# Patient Record
Sex: FEMALE | Race: WHITE | NOT HISPANIC OR LATINO | Employment: UNEMPLOYED | URBAN - METROPOLITAN AREA
[De-identification: names, ages, dates, MRNs, and addresses within clinical notes are randomized per-mention and may not be internally consistent; named-entity substitution may affect disease eponyms.]

---

## 2017-03-03 ENCOUNTER — LAB VISIT (OUTPATIENT)
Dept: LAB | Facility: HOSPITAL | Age: 27
End: 2017-03-03
Attending: OBSTETRICS & GYNECOLOGY
Payer: MEDICAID

## 2017-03-03 DIAGNOSIS — E03.9 UNSPECIFIED HYPOTHYROIDISM: Primary | ICD-10-CM

## 2017-03-03 DIAGNOSIS — Z00.00 ROUTINE GENERAL MEDICAL EXAMINATION AT A HEALTH CARE FACILITY: ICD-10-CM

## 2017-03-03 LAB
ALBUMIN SERPL BCP-MCNC: 3.5 G/DL
ALP SERPL-CCNC: 60 U/L
ALT SERPL W/O P-5'-P-CCNC: 26 U/L
ANION GAP SERPL CALC-SCNC: 8 MMOL/L
ANISOCYTOSIS BLD QL SMEAR: SLIGHT
AST SERPL-CCNC: 19 U/L
BASOPHILS # BLD AUTO: 0.01 K/UL
BASOPHILS NFR BLD: 0.1 %
BILIRUB SERPL-MCNC: 0.2 MG/DL
BUN SERPL-MCNC: 10 MG/DL
CALCIUM SERPL-MCNC: 8.9 MG/DL
CHLORIDE SERPL-SCNC: 110 MMOL/L
CHOLEST/HDLC SERPL: 4.2 {RATIO}
CO2 SERPL-SCNC: 23 MMOL/L
CREAT SERPL-MCNC: 0.7 MG/DL
DACRYOCYTES BLD QL SMEAR: ABNORMAL
DIFFERENTIAL METHOD: ABNORMAL
EOSINOPHIL # BLD AUTO: 0.1 K/UL
EOSINOPHIL NFR BLD: 1.1 %
ERYTHROCYTE [DISTWIDTH] IN BLOOD BY AUTOMATED COUNT: 13.4 %
EST. GFR  (AFRICAN AMERICAN): >60 ML/MIN/1.73 M^2
EST. GFR  (NON AFRICAN AMERICAN): >60 ML/MIN/1.73 M^2
GLUCOSE SERPL-MCNC: 92 MG/DL
HCT VFR BLD AUTO: 36.2 %
HDL/CHOLESTEROL RATIO: 23.7 %
HDLC SERPL-MCNC: 139 MG/DL
HDLC SERPL-MCNC: 33 MG/DL
HGB BLD-MCNC: 11.7 G/DL
LDLC SERPL CALC-MCNC: 85 MG/DL
LYMPHOCYTES # BLD AUTO: 2.2 K/UL
LYMPHOCYTES NFR BLD: 26.5 %
MCH RBC QN AUTO: 27.3 PG
MCHC RBC AUTO-ENTMCNC: 32.3 %
MCV RBC AUTO: 85 FL
MONOCYTES # BLD AUTO: 0.5 K/UL
MONOCYTES NFR BLD: 6.1 %
NEUTROPHILS # BLD AUTO: 5.4 K/UL
NEUTROPHILS NFR BLD: 66.4 %
NONHDLC SERPL-MCNC: 106 MG/DL
OVALOCYTES BLD QL SMEAR: ABNORMAL
PLATELET # BLD AUTO: 236 K/UL
PMV BLD AUTO: 10 FL
POIKILOCYTOSIS BLD QL SMEAR: SLIGHT
POTASSIUM SERPL-SCNC: 3.9 MMOL/L
PROT SERPL-MCNC: 7.3 G/DL
RBC # BLD AUTO: 4.28 M/UL
SODIUM SERPL-SCNC: 141 MMOL/L
SPHEROCYTES BLD QL SMEAR: ABNORMAL
T3 SERPL-MCNC: 115 NG/DL
T4 FREE SERPL-MCNC: 0.98 NG/DL
T4 SERPL-MCNC: 7.1 UG/DL
TRIGL SERPL-MCNC: 105 MG/DL
TSH SERPL DL<=0.005 MIU/L-ACNC: 2.53 UIU/ML
WBC # BLD AUTO: 8.2 K/UL

## 2017-03-03 PROCEDURE — 85025 COMPLETE CBC W/AUTO DIFF WBC: CPT

## 2017-03-03 PROCEDURE — 84480 ASSAY TRIIODOTHYRONINE (T3): CPT

## 2017-03-03 PROCEDURE — 84439 ASSAY OF FREE THYROXINE: CPT

## 2017-03-03 PROCEDURE — 36415 COLL VENOUS BLD VENIPUNCTURE: CPT

## 2017-03-03 PROCEDURE — 84436 ASSAY OF TOTAL THYROXINE: CPT

## 2017-03-03 PROCEDURE — 80053 COMPREHEN METABOLIC PANEL: CPT

## 2017-03-03 PROCEDURE — 80061 LIPID PANEL: CPT

## 2017-03-03 PROCEDURE — 84443 ASSAY THYROID STIM HORMONE: CPT

## 2017-03-08 PROBLEM — Z98.890 S/P SINUS SURGERY: Status: ACTIVE | Noted: 2017-03-08

## 2017-04-14 ENCOUNTER — HOSPITAL ENCOUNTER (EMERGENCY)
Facility: HOSPITAL | Age: 27
Discharge: HOME | End: 2017-04-14
Attending: EMERGENCY MEDICINE
Payer: MEDICAID

## 2017-04-14 VITALS
DIASTOLIC BLOOD PRESSURE: 78 MMHG | SYSTOLIC BLOOD PRESSURE: 130 MMHG | BODY MASS INDEX: 33.34 KG/M2 | HEIGHT: 68 IN | RESPIRATION RATE: 16 BRPM | WEIGHT: 220 LBS | TEMPERATURE: 98 F | OXYGEN SATURATION: 100 % | HEART RATE: 86 BPM

## 2017-04-14 DIAGNOSIS — R10.2 PELVIC PAIN IN FEMALE: ICD-10-CM

## 2017-04-14 DIAGNOSIS — M54.50 ACUTE BILATERAL LOW BACK PAIN WITHOUT SCIATICA: Primary | ICD-10-CM

## 2017-04-14 LAB
B-HCG UR QL: NEGATIVE
BACTERIA #/AREA URNS HPF: NORMAL /HPF
BILIRUB UR QL STRIP: NEGATIVE
CLARITY UR: CLEAR
COLOR UR: ABNORMAL
CTP QC/QA: YES
GLUCOSE UR QL STRIP: NEGATIVE
HGB UR QL STRIP: ABNORMAL
KETONES UR QL STRIP: NEGATIVE
LEUKOCYTE ESTERASE UR QL STRIP: ABNORMAL
MICROSCOPIC COMMENT: NORMAL
NITRITE UR QL STRIP: NEGATIVE
PH UR STRIP: 8 [PH] (ref 5–8)
PROT UR QL STRIP: NEGATIVE
RBC #/AREA URNS HPF: 2 /HPF (ref 0–4)
SP GR UR STRIP: 1 (ref 1–1.03)
SQUAMOUS #/AREA URNS HPF: 3 /HPF
URN SPEC COLLECT METH UR: ABNORMAL
UROBILINOGEN UR STRIP-ACNC: NEGATIVE EU/DL
WBC #/AREA URNS HPF: 2 /HPF (ref 0–5)

## 2017-04-14 PROCEDURE — 99283 EMERGENCY DEPT VISIT LOW MDM: CPT | Mod: 25

## 2017-04-14 PROCEDURE — 81000 URINALYSIS NONAUTO W/SCOPE: CPT

## 2017-04-14 PROCEDURE — 81025 URINE PREGNANCY TEST: CPT | Performed by: EMERGENCY MEDICINE

## 2017-04-14 NOTE — ED AVS SNAPSHOT
[facility]  [address] [place] [address]               Martin Huddleston   2017 12:33 PM   ED    Description:  Female : 1990   Department:  [facility]           Your Care was Coordinated By:     Provider Role From To    Guillermo Mosher III, MD Attending Provider [phone] --      Reason for Visit     burning urination           Diagnoses this Visit        Comments    Acute bilateral low back pain without sciatica    -  Primary     Pelvic pain in female           ED Disposition     ED Disposition Condition Comment    Discharge             To Do List           Follow-up Information     Follow up with Messi Hines MD In 1 week.    Specialties:  Obstetrics, Obstetrics and Gynecology    Contact information:    [address]  [place] [address]  [phone]        [facility] On Call     [facility] Care [facility] -  Assistance  Unless otherwise directed by your provider, please contact [facility], our nurse care line that is available for  assistance.     Registered nurses in the [facility] provide: appointment scheduling, clinical advisement, health education, and other advisory services.  Call: [phone] (toll free)               Medications           Message regarding Medications     Verify the changes and/or additions to your medication regime listed below are the same as discussed with your clinician today.  If any of these changes or additions are incorrect, please notify your healthcare provider.        STOP taking these medications     escitalopram oxalate (LEXAPRO) 10 MG tablet Take 1 tablet (10 mg total) by mouth once daily.    hydrocodone-acetaminophen 7.5-325mg (NORCO) 7.5-325 mg per tablet Take 7.5-325 tablets by mouth every 6 (six) hours as needed.           Verify that the below list of medications is an accurate representation of the medications you are currently taking.  If none  "reported, the list may be blank. If incorrect, please contact your healthcare provider. Carry this list with you in case of emergency.           Current Medications            Clinical Reference Information           Your Vitals Were     BP Pulse Temp Resp Height Weight    130/78 (BP Location: Right arm, Patient Position: Sitting, BP Method: Automatic) 86 98.4 °F (36.9 °C) (Oral) 16 5' 8" (1.727 m) 99.8 kg (220 lb)    Last Period SpO2 BMI          04/03/2017 (Exact Date) 100% 33.45 kg/m2        Allergies as of 4/14/2017     No Known Allergies      Immunizations Administered on Date of Encounter - 4/14/2017     None      ED Micro, Lab, POCT     Start Ordered       Status Ordering Provider    04/14/17 1314 04/14/17 1313  Urinalysis  STAT      Final result     04/14/17 1313 04/14/17 1313  Urinalysis Microscopic  Once      Final result     04/14/17 1140 04/14/17 1139  POCT urine pregnancy  Once      Final result       ED Imaging Orders     None        Discharge Instructions       Return to the emergency department if you develop fever higher than 100.4°, worsening abdominal pain, blood in your urine, vomiting, or for any new or worsening medical concerns.  As discussed, you need to see your OB/GYN for further evaluation of this pain.  You should also see your primary physician about this.  You should take MiraLAX daily until you are having at least 1 soft bowel movement every day.      [facility] Sign-Up     Activating your [facility] account is as easy as 1-2-3!     1) Visit [email or web address], select Sign Up Now, enter this activation code and your date of birth, then select Next.  [email or web address]  Expires: 5/29/2017  2:44 PM      2) Create a username and password to use when you visit [facility] in the future and select a security question in case you lose your password and select Next.    3) Enter your e-mail address and click Sign Up!    Additional Information  If you have questions, please e-mail [email or web address] or call [phone] to talk to our [facility] staff. Remember, [facility] is NOT to be used for urgent needs. For medical emergencies, dial 911.          [facility] complies with applicable Federal civil rights laws and does not discriminate on the basis of race, color, national origin, age, disability, or sex.        Language Assistance Services     ATTENTION: Language assistance services are available, free of charge. Please call [phone].      ATENCIÓN: Si habla gracyañol, tiene a stephen disposición servicios gratuitos de asistencia lingüística. Llame al [phone].     TRINIDAD Ý: N?u b?n nói Ti?ng Vi?t, có các d?ch v? h? tr? ngôn ng? mi?n phí dành cho b?n. G?i s? [phone].

## 2017-04-14 NOTE — DISCHARGE INSTRUCTIONS
Return to the emergency department if you develop fever higher than 100.4°, worsening abdominal pain, blood in your urine, vomiting, or for any new or worsening medical concerns.  As discussed, you need to see your OB/GYN for further evaluation of this pain.  You should also see your primary physician about this.  You should take MiraLAX daily until you are having at least 1 soft bowel movement every day.

## 2017-04-14 NOTE — ED PROVIDER NOTES
Encounter Date: 4/14/2017       History     Chief Complaint   Patient presents with    burning urination     pt c/o burning urination and lower back pain X 2 days.     Review of patient's allergies indicates:  No Known Allergies  HPI Comments: 26-year-old female presents complaining of bilateral low back/flank pain for 2 days with associated dysuria, nausea, and chills.  She describes the pain is constant, moderate to severe, nonradiating, aching.  She does report a previous episode of similar symptoms but does not know what the diagnosis was at that time.  She has concerns about kidney stone because her brother has had a stone before.  She denies treatment at home.  No diarrhea, fever, constipation, respiratory symptoms, food intolerance.    The history is provided by the patient.     Past Medical History:   Diagnosis Date    Constipation      Past Surgical History:   Procedure Laterality Date    intestinal surg       as a child    SINUS SURGERY  02/02/2017     Family History   Problem Relation Age of Onset    Colon cancer Father     Diabetes Mother     Hypertension Mother      Social History   Substance Use Topics    Smoking status: Never Smoker    Smokeless tobacco: Never Used    Alcohol use No     Review of Systems   Constitutional: Negative for fever.   HENT: Negative for sore throat.    Respiratory: Negative for shortness of breath.    Cardiovascular: Negative for chest pain.   Gastrointestinal: Negative for nausea.   Genitourinary: Negative for dysuria.   Musculoskeletal: Negative for back pain.   Skin: Negative for rash.   Neurological: Negative for weakness.   Hematological: Does not bruise/bleed easily.       Physical Exam   Initial Vitals   BP Pulse Resp Temp SpO2   04/14/17 1135 04/14/17 1135 04/14/17 1135 04/14/17 1135 04/14/17 1135   152/86 90 16 97.9 °F (36.6 °C) 97 %     Physical Exam    Constitutional: She appears well-developed and well-nourished. She is not diaphoretic. No distress.    HENT:   Head: Normocephalic and atraumatic.   Right Ear: External ear normal.   Left Ear: External ear normal.   Eyes: Conjunctivae and EOM are normal. Pupils are equal, round, and reactive to light.   Neck: Normal range of motion.   Cardiovascular: Normal rate and regular rhythm.   Pulmonary/Chest: No respiratory distress.   Abdominal: She exhibits no distension. There is tenderness (bilateral CVA tenderness, mild pelvic tenderness).   Musculoskeletal: She exhibits no edema.   Neurological: She is alert. GCS eye subscore is 4. GCS verbal subscore is 5. GCS motor subscore is 6.   Skin: Skin is warm and dry.   Psychiatric: She has a normal mood and affect. Thought content normal.         ED Course   Procedures  Labs Reviewed   POCT URINE PREGNANCY             Medical Decision Making:   Initial Assessment:   26-year-old female presents complaining of bilateral low back/flank tenderness for the last couple days with associated burning at the end of urination, nausea, and chills.  She denies urinary frequency, urinary urgency, foul-smelling urine.  Examination reveals mild pelvic tenderness, bilateral low back tenderness, no fever, normal heart rate, well-appearing overall.  Differential Diagnosis:   Cystitis, pyelonephritis most likely. Kidney stone, pelvic inflammatory disease, appendicitis, diverticulitis much less likely.  Will obtain urinalysis as initial screening test.    ED Management:  Patient's urinalysis is not consistent with urinary tract infection.  Urine pregnancy test negative.  On further questioning, the patient does report that she suffers with chronic constipation.  She also reports that this current episode of pain is actually more severe than, but similar to past episodes of pain that she has been having for years, which have been more frequent within the last month since she stopped drinking caffeine.  At this time she continues to deny vomiting, vaginal discharge, high risk sexual behavior,  fever.  Patient continues to be well-appearing.  She does now report that her pain is severe.  I have recommended further workup with CT, but the patient declines citing concerns for risks of radiation.  I discussed her differential diagnosis with her at length, including a discussion of gynecologic pathology.  Also discussed the need for follow-up with OB/GYN, primary care.  Return precautions given verbally and in writing.                   ED Course     Clinical Impression:   The primary encounter diagnosis was Acute bilateral low back pain without sciatica. A diagnosis of Pelvic pain in female was also pertinent to this visit.          Guillermo Mosher III, MD  04/14/17 7283

## 2017-05-08 ENCOUNTER — OFFICE VISIT (OUTPATIENT)
Dept: OBSTETRICS AND GYNECOLOGY | Facility: CLINIC | Age: 27
End: 2017-05-08
Payer: MEDICAID

## 2017-05-08 ENCOUNTER — TELEPHONE (OUTPATIENT)
Dept: OBSTETRICS AND GYNECOLOGY | Facility: CLINIC | Age: 27
End: 2017-05-08

## 2017-05-08 VITALS
HEIGHT: 68 IN | DIASTOLIC BLOOD PRESSURE: 70 MMHG | WEIGHT: 253 LBS | SYSTOLIC BLOOD PRESSURE: 126 MMHG | BODY MASS INDEX: 38.34 KG/M2

## 2017-05-08 DIAGNOSIS — Z32.01 POSITIVE URINE PREGNANCY TEST: ICD-10-CM

## 2017-05-08 DIAGNOSIS — N92.6 ABNORMAL MENSES: Primary | ICD-10-CM

## 2017-05-08 DIAGNOSIS — K04.7 TOOTH ABSCESS: ICD-10-CM

## 2017-05-08 LAB
B-HCG UR QL: POSITIVE
CTP QC/QA: YES

## 2017-05-08 PROCEDURE — 99999 PR PBB SHADOW E&M-EST. PATIENT-LVL II: CPT | Mod: PBBFAC,,, | Performed by: OBSTETRICS & GYNECOLOGY

## 2017-05-08 PROCEDURE — 99213 OFFICE O/P EST LOW 20 MIN: CPT | Mod: S$PBB,,, | Performed by: OBSTETRICS & GYNECOLOGY

## 2017-05-08 PROCEDURE — 99212 OFFICE O/P EST SF 10 MIN: CPT | Mod: PBBFAC | Performed by: OBSTETRICS & GYNECOLOGY

## 2017-05-08 PROCEDURE — 81025 URINE PREGNANCY TEST: CPT | Mod: PBBFAC | Performed by: OBSTETRICS & GYNECOLOGY

## 2017-05-08 RX ORDER — AMOXICILLIN 875 MG/1
875 TABLET, FILM COATED ORAL EVERY 12 HOURS
Qty: 20 TABLET | Refills: 0 | Status: SHIPPED | OUTPATIENT
Start: 2017-05-08 | End: 2017-05-18

## 2017-05-08 NOTE — TELEPHONE ENCOUNTER
----- Message from Erin Jarrell sent at 5/8/2017  8:31 AM CDT -----  Contact: self  Patient is scheduled for an appt next week . She took a home pregnancy test & it was positive .  She has questions about antibiotics .    [phone]   LL      05/08/2017 8:51 AM  Spoke with pt and scheduled her for a confirmation of pregnancy.

## 2017-05-08 NOTE — PROGRESS NOTES
Subjective:       Patient ID: Martin Huddleston is a 26 y.o. female.    Chief Complaint:  Possible Pregnancy      History of Present Illness  HPI  Missed Menses/ Possible Pregnancy  Patient complains of amenorrhea. She believes she could be pregnant. Pregnancy is desired. Sexual Activity: single partner, contraception: none. Current symptoms also include: positive home pregnancy test. Last period was normal.     Patient's last menstrual period was 2017 (exact date).     Pt also c/o tooth pain on L upper molars.                GYN & OB History  Patient's last menstrual period was 2017 (exact date).   Date of Last Pap: No result found    OB History    Para Term  AB SAB TAB Ectopic Multiple Living   3 2 2      0 2      # Outcome Date GA Lbr Be/2nd Weight Sex Delivery Anes PTL Lv   3 Current            2 Term 14 40w0d / 00:08 4.072 kg (8 lb 15.6 oz) M Vag-Spont EPI N Y   1 Term 08/10/12 40w0d   M Vag-Spont EPI N Y          Review of Systems  Review of Systems   Constitutional: Negative.    Respiratory: Negative.    Cardiovascular: Negative.    Gastrointestinal: Negative.    Genitourinary: Negative.    Musculoskeletal: Negative.    Hematological: Negative.    Psychiatric/Behavioral: Negative.    Breast: negative.            Objective:    Physical Exam:   Constitutional: She is oriented to person, place, and time. She appears well-developed and well-nourished. No distress.    HENT:   Head: Normocephalic and atraumatic.   Mouth/Throat:         Neck: Normal range of motion. No thyromegaly present.    Cardiovascular: Normal rate.     Pulmonary/Chest: Effort normal. No respiratory distress.        Abdominal: Soft. She exhibits no distension.             Musculoskeletal: Normal range of motion and moves all extremeties.       Neurological: She is alert and oriented to person, place, and time.    Skin: She is not diaphoretic.    Psychiatric: She has a normal mood and affect. Her behavior is  normal. Judgment and thought content normal.          Assessment:        1. Abnormal menses    2. Tooth abscess               Plan:      1.  Pt taking daily MVi  2.  Rx amoxil 875 mg bid x 10 d  3.  Dental letter given and pt told to f/u with dentist  4.  F/u 3 wks for NOB and sono

## 2017-05-08 NOTE — MR AVS SNAPSHOT
"    [facility] - OB/ GYN  [address] [facility] [address]  [place] [address]  Phone: [phone]                  Martin Huddleston   2017 10:20 AM   Office Visit    Description:  Female : 1990   Provider:  Messi Hines MD   Department:  [facility] - OB/ GYN           Reason for Visit     Possible Pregnancy                To Do List           Future Appointments        Provider Department Dept Phone    2017 1:30 PM Messi Hines MD [facility] - OB/ -[phone]      Goals (5 Years of Data)     None      [facility]     [facility] On Call Nurse Care Line -  Assistance  Unless otherwise directed by your provider, please contact [facility], our nurse care line that is available for  assistance.     Registered nurses in the [facility] provide: appointment scheduling, clinical advisement, health education, and other advisory services.  Call: [phone] (toll free)               Medications           Message regarding Medications     Verify the changes and/or additions to your medication regime listed below are the same as discussed with your clinician today.  If any of these changes or additions are incorrect, please notify your healthcare provider.             Verify that the below list of medications is an accurate representation of the medications you are currently taking.  If none reported, the list may be blank. If incorrect, please contact your healthcare provider. Carry this list with you in case of emergency.                Clinical Reference Information           Your Vitals Were     BP Height Weight Last Period BMI    126/70 5' 8" (1.727 m) 114.8 kg (253 lb) 2017 (Exact Date) 38.47 kg/m2      Blood Pressure          Most Recent Value    BP  126/70      Allergies as of 2017     No Known Allergies      Immunizations Administered on Date of Encounter - 2017     None      [facility] Sign-Up     Activating your [facility] account is as easy as 1-2-3! "     1) Visit [email or web address], select Sign Up Now, enter this activation code and your date of birth, then select Next.  [email or web address]  Expires: 5/29/2017  2:44 PM      2) Create a username and password to use when you visit [facility] in the future and select a security question in case you lose your password and select Next.    3) Enter your e-mail address and click Sign Up!    Additional Information  If you have questions, please e-mail [email or web address] or call [phone] to talk to our [facility] staff. Remember, [facility] is NOT to be used for urgent needs. For medical emergencies, dial 911.         Language Assistance Services     ATTENTION: Language assistance services are available, free of charge. Please call [phone].      ATENCIÓN: Si habla español, tiene a stephen disposición servicios gratuitos de asistencia lingüística. Llame al [phone].     CHÚ Ý: N?u b?n nói Ti?ng Vi?t, có các d?ch v? h? tr? ngôn ng? mi?n phí dành cho b?n. G?i s? [phone].         [facility] complies with applicable Federal civil rights laws and does not discriminate on the basis of race, color, national origin, age, disability, or sex.

## 2017-06-07 ENCOUNTER — INITIAL PRENATAL (OUTPATIENT)
Dept: OBSTETRICS AND GYNECOLOGY | Facility: CLINIC | Age: 27
End: 2017-06-07
Payer: MEDICAID

## 2017-06-07 ENCOUNTER — LAB VISIT (OUTPATIENT)
Dept: LAB | Facility: HOSPITAL | Age: 27
End: 2017-06-07
Attending: OBSTETRICS & GYNECOLOGY
Payer: MEDICAID

## 2017-06-07 VITALS
SYSTOLIC BLOOD PRESSURE: 118 MMHG | WEIGHT: 246.56 LBS | BODY MASS INDEX: 37.49 KG/M2 | HEART RATE: 60 BPM | DIASTOLIC BLOOD PRESSURE: 72 MMHG

## 2017-06-07 DIAGNOSIS — Z34.91 PREGNANCY WITH ONE FETUS, FIRST TRIMESTER: ICD-10-CM

## 2017-06-07 DIAGNOSIS — Z34.91 PREGNANCY WITH ONE FETUS, FIRST TRIMESTER: Primary | ICD-10-CM

## 2017-06-07 DIAGNOSIS — Z36.89 ENCOUNTER TO ESTABLISH GESTATIONAL AGE USING ULTRASOUND: ICD-10-CM

## 2017-06-07 PROBLEM — Z34.90 PREGNANCY WITH ONE FETUS: Status: ACTIVE | Noted: 2017-06-07

## 2017-06-07 LAB
ABO + RH BLD: NORMAL
ALBUMIN SERPL BCP-MCNC: 3.4 G/DL
ALP SERPL-CCNC: 52 U/L
ALT SERPL W/O P-5'-P-CCNC: 12 U/L
ANION GAP SERPL CALC-SCNC: 10 MMOL/L
AST SERPL-CCNC: 14 U/L
BASOPHILS # BLD AUTO: 0.02 K/UL
BASOPHILS NFR BLD: 0.3 %
BILIRUB SERPL-MCNC: 0.2 MG/DL
BLD GP AB SCN CELLS X3 SERPL QL: NORMAL
BUN SERPL-MCNC: 7 MG/DL
CALCIUM SERPL-MCNC: 9.4 MG/DL
CHLORIDE SERPL-SCNC: 106 MMOL/L
CO2 SERPL-SCNC: 23 MMOL/L
CREAT SERPL-MCNC: 0.6 MG/DL
DIFFERENTIAL METHOD: ABNORMAL
EOSINOPHIL # BLD AUTO: 0.1 K/UL
EOSINOPHIL NFR BLD: 1.3 %
ERYTHROCYTE [DISTWIDTH] IN BLOOD BY AUTOMATED COUNT: 14.1 %
EST. GFR  (AFRICAN AMERICAN): >60 ML/MIN/1.73 M^2
EST. GFR  (NON AFRICAN AMERICAN): >60 ML/MIN/1.73 M^2
GLUCOSE SERPL-MCNC: 85 MG/DL
HCT VFR BLD AUTO: 35.5 %
HGB BLD-MCNC: 11.8 G/DL
LYMPHOCYTES # BLD AUTO: 1.9 K/UL
LYMPHOCYTES NFR BLD: 27.6 %
MCH RBC QN AUTO: 27.1 PG
MCHC RBC AUTO-ENTMCNC: 33.2 %
MCV RBC AUTO: 81 FL
MONOCYTES # BLD AUTO: 0.4 K/UL
MONOCYTES NFR BLD: 6 %
NEUTROPHILS # BLD AUTO: 4.4 K/UL
NEUTROPHILS NFR BLD: 64.7 %
PLATELET # BLD AUTO: 201 K/UL
PMV BLD AUTO: 9.9 FL
POTASSIUM SERPL-SCNC: 3.7 MMOL/L
PROT SERPL-MCNC: 7.3 G/DL
RBC # BLD AUTO: 4.36 M/UL
SODIUM SERPL-SCNC: 139 MMOL/L
WBC # BLD AUTO: 6.81 K/UL

## 2017-06-07 PROCEDURE — 36415 COLL VENOUS BLD VENIPUNCTURE: CPT

## 2017-06-07 PROCEDURE — 86592 SYPHILIS TEST NON-TREP QUAL: CPT

## 2017-06-07 PROCEDURE — 85025 COMPLETE CBC W/AUTO DIFF WBC: CPT

## 2017-06-07 PROCEDURE — 87340 HEPATITIS B SURFACE AG IA: CPT

## 2017-06-07 PROCEDURE — 86803 HEPATITIS C AB TEST: CPT

## 2017-06-07 PROCEDURE — 99204 OFFICE O/P NEW MOD 45 MIN: CPT | Mod: TH,25,S$PBB, | Performed by: OBSTETRICS & GYNECOLOGY

## 2017-06-07 PROCEDURE — 99999 PR PBB SHADOW E&M-EST. PATIENT-LVL III: CPT | Mod: PBBFAC,,, | Performed by: OBSTETRICS & GYNECOLOGY

## 2017-06-07 PROCEDURE — 86762 RUBELLA ANTIBODY: CPT

## 2017-06-07 PROCEDURE — 83036 HEMOGLOBIN GLYCOSYLATED A1C: CPT

## 2017-06-07 PROCEDURE — 80053 COMPREHEN METABOLIC PANEL: CPT

## 2017-06-07 PROCEDURE — 86850 RBC ANTIBODY SCREEN: CPT

## 2017-06-07 PROCEDURE — 76801 OB US < 14 WKS SINGLE FETUS: CPT | Mod: 26,S$PBB,, | Performed by: OBSTETRICS & GYNECOLOGY

## 2017-06-07 PROCEDURE — 86703 HIV-1/HIV-2 1 RESULT ANTBDY: CPT

## 2017-06-07 PROCEDURE — 86900 BLOOD TYPING SEROLOGIC ABO: CPT

## 2017-06-08 LAB
C TRACH DNA SPEC QL NAA+PROBE: NOT DETECTED
ESTIMATED AVG GLUCOSE: 108 MG/DL
HBA1C MFR BLD HPLC: 5.4 %
HBV SURFACE AG SERPL QL IA: NEGATIVE
HCV AB SERPL QL IA: NEGATIVE
HIV 1+2 AB+HIV1 P24 AG SERPL QL IA: NEGATIVE
N GONORRHOEA DNA SPEC QL NAA+PROBE: NOT DETECTED
RPR SER QL: NORMAL
RUBV IGG SER-ACNC: 182 IU/ML
RUBV IGG SER-IMP: REACTIVE

## 2017-06-08 NOTE — PROGRESS NOTES
[facility]  Ambulatory Clinic  Obstetrics & Gynecology    Visit Date:  2017     Chief Complaint:  Establish prenatal care    Dating Criteria:     LMP:  2017  MO by LMP:  2018  OM by 9w3d u/s on 2017:  2018     Working MO:  2018, by Last Menstrual Period    History of Present Illness:      Shefa Rocky Huddleston is a 26 y.o.  at 9w2d gestation by LMP consistent with today's u/s here to establish prenatal care.     Pt has not had any prenatal care for this pregnancy.     Pt has no major complaints today and denies any vaginal bleeding, leakage of fluid, contractions, or pain.     Her previous pregnancies were fairly uneventful and she delivered vaginal at term without complications per pt.    Otherwise, pt is in her usual state of health.    Past Medical History:      None       Past Surgical History:     Sinus surgery    Medications:      Prenatal vitamins    Allergies:     NKDA      Obstetric History:       Para Term  AB Living   3 2 2   2   SAB TAB Ectopic Multiple Live Births      0 2   # Outcome Date GA Lbr Be/2nd Weight Sex Delivery Anes PTL Lv   3 Current            2 Term 14 40w0d / 00:08 4.072 kg (8 lb 15.6 oz) M Vag-Spont EPI N BREA   1 Term 08/10/12 40w0d   M Vag-Spont EPI N BREA     Gynecologic History:      Denies recent/active STI  Denies history abnormal Pap     Social History:      Denies tobacco, alcohol or illicit drug use  Current partner is father of baby  Denies domestic abuse     Family History:       Denies congenital anomalies, inherited syndromes, fetal aneuploidy    Review of Systems:      Constitutional:  No fever, fatigue  HENT:  No congestion, hearing changes  Eyes:  No visual disturbance  Respiratory:  No cough, shortness of breath  Cardiovascular:  No chest pain, leg swelling  Breast:  No lump, pain, nipple discharge, redness, skin changes  Gastrointestinal:  No abdominal pain, constipation, blood in stool    Genitourinary:  No dysuria, frequency  Endocrine:  No heat or cold intolerance  Musculoskeletal:  No back pain, arthralgias  Skin:  No rash, jaundice  Neurological:  No dizziness, weakness, headaches  Psychiatric/Behavioral:  No sleep disturbance, dysphoric mood     Physical Exam:     /72   Wt 111.8 kg (246 lb 9.4 oz)   LMP 2017 (Exact Date)   BMI 37.49 kg/m²      GENERAL:  NAD. Well-nourished. A&Ox3.  HEENT:  NCAT, EOMI, PERRLA, moist mucus membranes.  Neck supple w/o masses.  BREAST:  Symmetric, no obvious masses, adenopathy, skin changes or nipple discharge.  LUNGS:  CTA-B.  HEART:  RRR, physiologic heart sounds.  ABDOMEN:  Soft, non-tender. Normoactive BS.  No obvious organomegaly.  Size = dates.  .  EXT:  Symmetric w/o cramping, claudication, or edema. +2 distal pulses. FROM.  SKIN:  No rashes or bruising.  NEURO:  CN II - XII grossly intact bilaterally. +2 DTR.  PSYCH:  Mood & affect appropriate.       PELVIC:  Female external genitalia w/o any obvious lesions.  Adequate perineal body. Normal urethral meatus. No gross lymphadenopathy.     VAGINA:  Pink, moist, well-rugated.  Good support.  No obvious lesion.  No discharge noted.     CERVIX:  No cervical motion tenderness, discharge, or obvious lesions.  Closed, thick, posterior.  UTERUS:  Small, non-tender, normal contour.  ADNEXA:  No masses or tenderness.    RECTAL:  Declined.  No obvious external lesions.   WET PREP:  Negative    Chaperone present for exam.     Assessment:     26 y.o.  at 9w2d by LMP consistent with today's u/s here to establish prenatal care    Plan:    Principles of prenatal care, weight gain goals, dietary/lifestyle modifications, pregnancy care instructions and precautions were discussed in detail.  Pt was provided literature regarding pregnancy, maternity care, and childbirth.  Continue prenatal vitamins.      Initial OB labs today    Dating ultrasound today    We discussed first trimester aneuploidy  screening options, pt declined and states she would not terminate the pregnancy for any reasons.    Return in 4 weeks, or sooner as needed.  Go to ED for any emergencies.  All questions answered, pt voiced understanding.      Bryson Raymundo MD

## 2017-06-09 LAB — BACTERIA UR CULT: NORMAL

## 2017-07-05 ENCOUNTER — LAB VISIT (OUTPATIENT)
Dept: LAB | Facility: HOSPITAL | Age: 27
End: 2017-07-05
Attending: OBSTETRICS & GYNECOLOGY
Payer: MEDICAID

## 2017-07-05 ENCOUNTER — ROUTINE PRENATAL (OUTPATIENT)
Dept: OBSTETRICS AND GYNECOLOGY | Facility: CLINIC | Age: 27
End: 2017-07-05
Payer: MEDICAID

## 2017-07-05 VITALS
WEIGHT: 252.75 LBS | BODY MASS INDEX: 38.43 KG/M2 | DIASTOLIC BLOOD PRESSURE: 66 MMHG | SYSTOLIC BLOOD PRESSURE: 122 MMHG

## 2017-07-05 DIAGNOSIS — Z34.91 PREGNANCY WITH ONE FETUS, FIRST TRIMESTER: Primary | ICD-10-CM

## 2017-07-05 DIAGNOSIS — Z34.91 PREGNANCY WITH ONE FETUS, FIRST TRIMESTER: ICD-10-CM

## 2017-07-05 LAB
T4 FREE SERPL-MCNC: 0.92 NG/DL
TSH SERPL DL<=0.005 MIU/L-ACNC: 2.07 UIU/ML

## 2017-07-05 PROCEDURE — 84443 ASSAY THYROID STIM HORMONE: CPT

## 2017-07-05 PROCEDURE — 99213 OFFICE O/P EST LOW 20 MIN: CPT | Mod: TH,S$PBB,, | Performed by: OBSTETRICS & GYNECOLOGY

## 2017-07-05 PROCEDURE — 36415 COLL VENOUS BLD VENIPUNCTURE: CPT

## 2017-07-05 PROCEDURE — 84439 ASSAY OF FREE THYROXINE: CPT

## 2017-07-05 PROCEDURE — 99999 PR PBB SHADOW E&M-EST. PATIENT-LVL II: CPT | Mod: PBBFAC,,, | Performed by: OBSTETRICS & GYNECOLOGY

## 2017-07-05 NOTE — PROGRESS NOTES
13w2d here for OB visit.  Pt c/o mild fatigue after recent fast for Ramadan.  Pt advised on importance of staying well hydrate during pregnancy.  Dietary advice.  Order TSH, Free T4.  Pt declined first trimester aneuploidy screening, and state she would not terminate the pregnancy for any reasons.  Principles of prenatal care and precautions reviewed.  Return 4 wks.   Voiced understanding.

## 2017-08-03 ENCOUNTER — ROUTINE PRENATAL (OUTPATIENT)
Dept: OBSTETRICS AND GYNECOLOGY | Facility: CLINIC | Age: 27
End: 2017-08-03
Payer: MEDICAID

## 2017-08-03 ENCOUNTER — LAB VISIT (OUTPATIENT)
Dept: LAB | Facility: HOSPITAL | Age: 27
End: 2017-08-03
Attending: OBSTETRICS & GYNECOLOGY
Payer: MEDICAID

## 2017-08-03 VITALS
SYSTOLIC BLOOD PRESSURE: 115 MMHG | BODY MASS INDEX: 38.21 KG/M2 | DIASTOLIC BLOOD PRESSURE: 80 MMHG | WEIGHT: 251.31 LBS

## 2017-08-03 DIAGNOSIS — Z34.92 PREGNANCY WITH ONE FETUS, SECOND TRIMESTER: Primary | ICD-10-CM

## 2017-08-03 DIAGNOSIS — Z34.92 PREGNANCY WITH ONE FETUS, SECOND TRIMESTER: ICD-10-CM

## 2017-08-03 PROCEDURE — 36415 COLL VENOUS BLD VENIPUNCTURE: CPT

## 2017-08-03 PROCEDURE — 81511 FTL CGEN ABNOR FOUR ANAL: CPT

## 2017-08-03 PROCEDURE — 3008F BODY MASS INDEX DOCD: CPT | Mod: ,,, | Performed by: OBSTETRICS & GYNECOLOGY

## 2017-08-03 PROCEDURE — 99999 PR PBB SHADOW E&M-EST. PATIENT-LVL II: CPT | Mod: PBBFAC,,, | Performed by: OBSTETRICS & GYNECOLOGY

## 2017-08-03 PROCEDURE — 99213 OFFICE O/P EST LOW 20 MIN: CPT | Mod: TH,S$PBB,, | Performed by: OBSTETRICS & GYNECOLOGY

## 2017-08-03 NOTE — PROGRESS NOTES
17w3d here for OB visit.  Pt has no major complaints today.  Reports active fetus.  Denies vaginal bleeding, leakage of fluid, or contractions.  Order quad screen.  Anatomy ultrasound next visit  Return 4 wks.   Voiced understanding.

## 2017-08-04 LAB
# FETUSES US: NORMAL
2ND TRIMESTER 4 SCREEN PNL SERPL: NEGATIVE
2ND TRIMESTER 4 SCREEN SERPL-IMP: NORMAL
AFP MOM SERPL: 1.27
AFP SERPL-MCNC: 35 NG/ML
AGE AT DELIVERY: 28
B-HCG MOM SERPL: 1.1
B-HCG SERPL-ACNC: 20.4 IU/ML
FET TS 21 RISK FROM MAT AGE: NORMAL
GA (DAYS): 4 D
GA (WEEKS): 17 WK
GA METHOD: NORMAL
IDDM PATIENT QL: NORMAL
INHIBIN A MOM SERPL: 0.8
INHIBIN A SERPL-MCNC: 100.1 PG/ML
SMOKING STATUS FTND: NORMAL
TS 18 RISK FETUS: NORMAL
TS 21 RISK FETUS: NORMAL
U ESTRIOL MOM SERPL: 0.72
U ESTRIOL SERPL-MCNC: 0.74 NG/ML

## 2017-08-24 ENCOUNTER — ROUTINE PRENATAL (OUTPATIENT)
Dept: OBSTETRICS AND GYNECOLOGY | Facility: CLINIC | Age: 27
End: 2017-08-24
Payer: MEDICAID

## 2017-08-24 VITALS
BODY MASS INDEX: 39.22 KG/M2 | SYSTOLIC BLOOD PRESSURE: 120 MMHG | DIASTOLIC BLOOD PRESSURE: 78 MMHG | WEIGHT: 257.94 LBS

## 2017-08-24 DIAGNOSIS — Z34.92 PREGNANCY WITH ONE FETUS, SECOND TRIMESTER: Primary | ICD-10-CM

## 2017-08-24 DIAGNOSIS — Z36.3 ANTENATAL SCREENING FOR MALFORMATION USING ULTRASONICS: ICD-10-CM

## 2017-08-24 PROCEDURE — 76805 OB US >/= 14 WKS SNGL FETUS: CPT | Mod: 26,S$PBB,, | Performed by: OBSTETRICS & GYNECOLOGY

## 2017-08-24 PROCEDURE — 99999 PR PBB SHADOW E&M-EST. PATIENT-LVL II: CPT | Mod: PBBFAC,,, | Performed by: OBSTETRICS & GYNECOLOGY

## 2017-08-24 PROCEDURE — 99213 OFFICE O/P EST LOW 20 MIN: CPT | Mod: TH,25,S$PBB, | Performed by: OBSTETRICS & GYNECOLOGY

## 2017-08-24 PROCEDURE — 99212 OFFICE O/P EST SF 10 MIN: CPT | Mod: PBBFAC,25 | Performed by: OBSTETRICS & GYNECOLOGY

## 2017-08-24 PROCEDURE — 3008F BODY MASS INDEX DOCD: CPT | Mod: ,,, | Performed by: OBSTETRICS & GYNECOLOGY

## 2017-08-24 PROCEDURE — 76805 OB US >/= 14 WKS SNGL FETUS: CPT | Mod: PBBFAC | Performed by: OBSTETRICS & GYNECOLOGY

## 2017-08-24 NOTE — PROGRESS NOTES
Pt c/o weight gain.  Rooming in education discussed and given to pt and learn your baby video shown. kt

## 2017-08-24 NOTE — PROGRESS NOTES
20w3d here for OB visit and anatomy ultrasound.  No major complaints today.  Reports active fetus.  Denies vaginal bleeding, leakage of fluid, or contractions.  Anatomy u/s shows normal appearing fetus with dating c/w established EDC.  Limitation of today's u/s exam discussed.  Quad screen negative.   Discussed weight gain.  Encourage healthy lifestyle modifications.  Labor precautions.  Return 4 wks.   Voiced understanding.

## 2017-09-29 ENCOUNTER — PATIENT MESSAGE (OUTPATIENT)
Dept: OBSTETRICS AND GYNECOLOGY | Facility: CLINIC | Age: 27
End: 2017-09-29

## 2017-11-28 ENCOUNTER — ROUTINE PRENATAL (OUTPATIENT)
Dept: OBSTETRICS AND GYNECOLOGY | Facility: CLINIC | Age: 27
End: 2017-11-28
Payer: MEDICAID

## 2017-11-28 VITALS — BODY MASS INDEX: 38.92 KG/M2 | DIASTOLIC BLOOD PRESSURE: 67 MMHG | WEIGHT: 256 LBS | SYSTOLIC BLOOD PRESSURE: 133 MMHG

## 2017-11-28 DIAGNOSIS — Z3A.34 34 WEEKS GESTATION OF PREGNANCY: Primary | ICD-10-CM

## 2017-11-28 PROCEDURE — 99213 OFFICE O/P EST LOW 20 MIN: CPT | Mod: TH,S$PBB,, | Performed by: OBSTETRICS & GYNECOLOGY

## 2017-11-28 PROCEDURE — 99999 PR PBB SHADOW E&M-EST. PATIENT-LVL II: CPT | Mod: PBBFAC,,, | Performed by: OBSTETRICS & GYNECOLOGY

## 2017-11-28 PROCEDURE — 99212 OFFICE O/P EST SF 10 MIN: CPT | Mod: PBBFAC | Performed by: OBSTETRICS & GYNECOLOGY

## 2017-11-28 RX ORDER — CLOBETASOL PROPIONATE 0.5 MG/G
OINTMENT TOPICAL
Refills: 3 | Status: ON HOLD | COMMUNITY
Start: 2017-11-16 | End: 2017-12-31 | Stop reason: HOSPADM

## 2017-11-28 RX ORDER — TRIAMCINOLONE ACETONIDE 5 MG/G
OINTMENT TOPICAL
Refills: 3 | Status: ON HOLD | COMMUNITY
Start: 2017-11-20 | End: 2017-12-31 | Stop reason: HOSPADM

## 2017-11-28 RX ORDER — PNV,CALCIUM 72/IRON/FOLIC ACID 27 MG-1 MG
TABLET ORAL
Refills: 11 | COMMUNITY
Start: 2017-11-24

## 2017-12-12 ENCOUNTER — ROUTINE PRENATAL (OUTPATIENT)
Dept: OBSTETRICS AND GYNECOLOGY | Facility: CLINIC | Age: 27
End: 2017-12-12
Payer: MEDICAID

## 2017-12-12 VITALS — SYSTOLIC BLOOD PRESSURE: 131 MMHG | DIASTOLIC BLOOD PRESSURE: 66 MMHG | BODY MASS INDEX: 41.21 KG/M2 | WEIGHT: 271 LBS

## 2017-12-12 DIAGNOSIS — Z3A.36 36 WEEKS GESTATION OF PREGNANCY: Primary | ICD-10-CM

## 2017-12-12 PROCEDURE — 99212 OFFICE O/P EST SF 10 MIN: CPT | Mod: PBBFAC | Performed by: OBSTETRICS & GYNECOLOGY

## 2017-12-12 PROCEDURE — 99213 OFFICE O/P EST LOW 20 MIN: CPT | Mod: TH,S$PBB,, | Performed by: OBSTETRICS & GYNECOLOGY

## 2017-12-12 PROCEDURE — 87081 CULTURE SCREEN ONLY: CPT

## 2017-12-12 PROCEDURE — 87086 URINE CULTURE/COLONY COUNT: CPT

## 2017-12-12 PROCEDURE — 99999 PR PBB SHADOW E&M-EST. PATIENT-LVL II: CPT | Mod: PBBFAC,,, | Performed by: OBSTETRICS & GYNECOLOGY

## 2017-12-12 NOTE — PROGRESS NOTES
Good fm.  Denies ctx, vb, lof. She reports abdominal hair and darkening skin. She is concerned because this is the worst. Discussed sometimes that happens with male baby will get more abdminal hair. Pt is reporting increased leaking of urine. Will send for UC&S. Pt has gained 7kg since last visit 2 weeks ago. Monitor weight gain.   GBS/Consents today.

## 2017-12-14 LAB
BACTERIA SPEC AEROBE CULT: NORMAL
BACTERIA UR CULT: NORMAL

## 2017-12-19 ENCOUNTER — ROUTINE PRENATAL (OUTPATIENT)
Dept: OBSTETRICS AND GYNECOLOGY | Facility: CLINIC | Age: 27
End: 2017-12-19
Payer: MEDICAID

## 2017-12-19 VITALS
SYSTOLIC BLOOD PRESSURE: 131 MMHG | WEIGHT: 266.75 LBS | BODY MASS INDEX: 40.56 KG/M2 | DIASTOLIC BLOOD PRESSURE: 72 MMHG

## 2017-12-19 DIAGNOSIS — Z20.828 EXPOSURE TO THE FLU: ICD-10-CM

## 2017-12-19 DIAGNOSIS — Z3A.37 37 WEEKS GESTATION OF PREGNANCY: Primary | ICD-10-CM

## 2017-12-19 PROCEDURE — 99999 PR PBB SHADOW E&M-EST. PATIENT-LVL II: CPT | Mod: PBBFAC,,, | Performed by: OBSTETRICS & GYNECOLOGY

## 2017-12-19 PROCEDURE — 99213 OFFICE O/P EST LOW 20 MIN: CPT | Mod: TH,S$PBB,, | Performed by: OBSTETRICS & GYNECOLOGY

## 2017-12-19 PROCEDURE — 99212 OFFICE O/P EST SF 10 MIN: CPT | Mod: PBBFAC | Performed by: OBSTETRICS & GYNECOLOGY

## 2017-12-19 RX ORDER — OSELTAMIVIR PHOSPHATE 75 MG/1
75 CAPSULE ORAL DAILY
Qty: 10 CAPSULE | Refills: 0 | Status: ON HOLD | OUTPATIENT
Start: 2017-12-19 | End: 2017-12-31 | Stop reason: HOSPADM

## 2017-12-19 NOTE — PROGRESS NOTES
Good fm.  Denies ctx, vb, lof. She reports her  and daughter were diagnosed with the flu. Will treat with oseltamavir.

## 2017-12-26 ENCOUNTER — TELEPHONE (OUTPATIENT)
Dept: OBSTETRICS AND GYNECOLOGY | Facility: CLINIC | Age: 27
End: 2017-12-26

## 2017-12-26 ENCOUNTER — PATIENT MESSAGE (OUTPATIENT)
Dept: OBSTETRICS AND GYNECOLOGY | Facility: CLINIC | Age: 27
End: 2017-12-26

## 2017-12-26 NOTE — TELEPHONE ENCOUNTER
----- Message from Bradford Gibson sent at 12/26/2017  9:41 AM CST -----  Contact: PT /[phone]  Calling TO speak with doc or nurse to find  out if medication was called in   ---------------------------------------------------------  12/26/17 @ 0095 (OMAR)  SPOKE WITH MS PORRAS , PT STATED SHE HAS AN EAR INFECTION AND SHE WENT TO HER URGENT CARE , WANTING TO KNOW WHAT SHE CAN TAKE , INFORMED HER THAT SHE CAN HAVE TYLENOL REGULAR STRENGTH 1-2 TABS PO SQ 4-6 HRS AS NEEDED FOR PAIN NOT TO EXCEED 10 TABS IN 24 HRS, SHE CAN HAVE MUCINEX, SUDAFED , BENADRYL, WITHOUT THE DM, PT STATED HER UNDERSTANDING

## 2017-12-28 ENCOUNTER — HOSPITAL ENCOUNTER (INPATIENT)
Facility: HOSPITAL | Age: 27
LOS: 3 days | Discharge: HOME | End: 2017-12-31
Attending: OBSTETRICS & GYNECOLOGY | Admitting: OBSTETRICS & GYNECOLOGY
Payer: MEDICAID

## 2017-12-28 ENCOUNTER — ANESTHESIA (OUTPATIENT)
Dept: OBSTETRICS AND GYNECOLOGY | Facility: HOSPITAL | Age: 27
End: 2017-12-28
Payer: MEDICAID

## 2017-12-28 ENCOUNTER — ANESTHESIA EVENT (OUTPATIENT)
Dept: OBSTETRICS AND GYNECOLOGY | Facility: HOSPITAL | Age: 27
End: 2017-12-28
Payer: MEDICAID

## 2017-12-28 DIAGNOSIS — Z34.90 PREGNANCY: ICD-10-CM

## 2017-12-28 LAB
ABO + RH BLD: NORMAL
BASOPHILS # BLD AUTO: 0.01 K/UL
BASOPHILS NFR BLD: 0.1 %
BLD GP AB SCN CELLS X3 SERPL QL: NORMAL
DIFFERENTIAL METHOD: ABNORMAL
EOSINOPHIL # BLD AUTO: 0 K/UL
EOSINOPHIL NFR BLD: 0.3 %
ERYTHROCYTE [DISTWIDTH] IN BLOOD BY AUTOMATED COUNT: 14.3 %
HCT VFR BLD AUTO: 32.9 %
HGB BLD-MCNC: 10.9 G/DL
LYMPHOCYTES # BLD AUTO: 1.7 K/UL
LYMPHOCYTES NFR BLD: 16.8 %
MCH RBC QN AUTO: 26.5 PG
MCHC RBC AUTO-ENTMCNC: 33.1 G/DL
MCV RBC AUTO: 80 FL
MONOCYTES # BLD AUTO: 0.7 K/UL
MONOCYTES NFR BLD: 7.5 %
NEUTROPHILS # BLD AUTO: 7.4 K/UL
NEUTROPHILS NFR BLD: 75.3 %
PLATELET # BLD AUTO: 209 K/UL
PMV BLD AUTO: 9.4 FL
RBC # BLD AUTO: 4.11 M/UL
WBC # BLD AUTO: 9.83 K/UL

## 2017-12-28 PROCEDURE — 99211 OFF/OP EST MAY X REQ PHY/QHP: CPT

## 2017-12-28 PROCEDURE — 62326 NJX INTERLAMINAR LMBR/SAC: CPT | Performed by: ANESTHESIOLOGY

## 2017-12-28 PROCEDURE — 86592 SYPHILIS TEST NON-TREP QUAL: CPT

## 2017-12-28 PROCEDURE — 59409 OBSTETRICAL CARE: CPT | Mod: AA,,, | Performed by: ANESTHESIOLOGY

## 2017-12-28 PROCEDURE — 86850 RBC ANTIBODY SCREEN: CPT

## 2017-12-28 PROCEDURE — 27200710 HC EPIDURAL INFUSION PUMP SET: Performed by: ANESTHESIOLOGY

## 2017-12-28 PROCEDURE — 27800517 HC TRAY,EPIDURAL-CONTINUOUS: Performed by: ANESTHESIOLOGY

## 2017-12-28 PROCEDURE — 25000003 PHARM REV CODE 250: Performed by: OBSTETRICS & GYNECOLOGY

## 2017-12-28 PROCEDURE — 51702 INSERT TEMP BLADDER CATH: CPT

## 2017-12-28 PROCEDURE — 25000003 PHARM REV CODE 250: Performed by: ANESTHESIOLOGY

## 2017-12-28 PROCEDURE — 63600175 PHARM REV CODE 636 W HCPCS: Performed by: ANESTHESIOLOGY

## 2017-12-28 PROCEDURE — 72100002 HC LABOR CARE, 1ST 8 HOURS

## 2017-12-28 PROCEDURE — 85025 COMPLETE CBC W/AUTO DIFF WBC: CPT

## 2017-12-28 PROCEDURE — 11000001 HC ACUTE MED/SURG PRIVATE ROOM

## 2017-12-28 RX ORDER — FENTANYL/BUPIVACAINE/NS/PF 2MCG/ML-.1
PLASTIC BAG, INJECTION (ML) INJECTION CONTINUOUS PRN
Status: DISCONTINUED | OUTPATIENT
Start: 2017-12-28 | End: 2017-12-29

## 2017-12-28 RX ORDER — SODIUM CHLORIDE, SODIUM LACTATE, POTASSIUM CHLORIDE, CALCIUM CHLORIDE 600; 310; 30; 20 MG/100ML; MG/100ML; MG/100ML; MG/100ML
INJECTION, SOLUTION INTRAVENOUS CONTINUOUS
Status: DISCONTINUED | OUTPATIENT
Start: 2017-12-28 | End: 2017-12-29

## 2017-12-28 RX ORDER — OXYTOCIN/RINGER'S LACTATE 20/1000 ML
41.65 PLASTIC BAG, INJECTION (ML) INTRAVENOUS CONTINUOUS PRN
Status: DISCONTINUED | OUTPATIENT
Start: 2017-12-28 | End: 2017-12-31 | Stop reason: HOSPADM

## 2017-12-28 RX ORDER — BUPIVACAINE HYDROCHLORIDE 2.5 MG/ML
INJECTION, SOLUTION EPIDURAL; INFILTRATION; INTRACAUDAL
Status: DISCONTINUED | OUTPATIENT
Start: 2017-12-28 | End: 2017-12-29

## 2017-12-28 RX ORDER — FENTANYL CITRATE 50 UG/ML
INJECTION, SOLUTION INTRAMUSCULAR; INTRAVENOUS
Status: DISCONTINUED | OUTPATIENT
Start: 2017-12-28 | End: 2017-12-29

## 2017-12-28 RX ORDER — LIDOCAINE HYDROCHLORIDE 10 MG/ML
1 INJECTION, SOLUTION EPIDURAL; INFILTRATION; INTRACAUDAL; PERINEURAL ONCE AS NEEDED
Status: ACTIVE | OUTPATIENT
Start: 2017-12-28 | End: 2017-12-28

## 2017-12-28 RX ADMIN — Medication 10 ML/HR: at 09:12

## 2017-12-28 RX ADMIN — BUPIVACAINE HYDROCHLORIDE 5 ML: 2.5 INJECTION, SOLUTION EPIDURAL; INFILTRATION; INTRACAUDAL; PERINEURAL at 09:12

## 2017-12-28 RX ADMIN — SODIUM CHLORIDE, SODIUM LACTATE, POTASSIUM CHLORIDE, AND CALCIUM CHLORIDE: .6; .31; .03; .02 INJECTION, SOLUTION INTRAVENOUS at 09:12

## 2017-12-28 RX ADMIN — FENTANYL CITRATE 100 MCG: 50 INJECTION INTRAMUSCULAR; INTRAVENOUS at 09:12

## 2017-12-28 RX ADMIN — SODIUM CHLORIDE, SODIUM LACTATE, POTASSIUM CHLORIDE, AND CALCIUM CHLORIDE: .6; .31; .03; .02 INJECTION, SOLUTION INTRAVENOUS at 07:12

## 2017-12-29 PROBLEM — Z34.90 PREGNANCY: Status: RESOLVED | Noted: 2017-12-28 | Resolved: 2017-12-29

## 2017-12-29 PROBLEM — Z86.59 HISTORY OF DEPRESSION: Status: ACTIVE | Noted: 2017-12-29

## 2017-12-29 PROBLEM — Z34.90 PREGNANCY WITH ONE FETUS: Status: RESOLVED | Noted: 2017-06-07 | Resolved: 2017-12-29

## 2017-12-29 LAB
BASOPHILS # BLD AUTO: 0.01 K/UL
BASOPHILS NFR BLD: 0.1 %
DIFFERENTIAL METHOD: ABNORMAL
EOSINOPHIL # BLD AUTO: 0 K/UL
EOSINOPHIL NFR BLD: 0.2 %
ERYTHROCYTE [DISTWIDTH] IN BLOOD BY AUTOMATED COUNT: 14.5 %
HCT VFR BLD AUTO: 29.5 %
HGB BLD-MCNC: 9.8 G/DL
LYMPHOCYTES # BLD AUTO: 2 K/UL
LYMPHOCYTES NFR BLD: 18.5 %
MCH RBC QN AUTO: 26.6 PG
MCHC RBC AUTO-ENTMCNC: 33.2 G/DL
MCV RBC AUTO: 80 FL
MONOCYTES # BLD AUTO: 1 K/UL
MONOCYTES NFR BLD: 9.3 %
NEUTROPHILS # BLD AUTO: 7.6 K/UL
NEUTROPHILS NFR BLD: 71.9 %
PLATELET # BLD AUTO: 215 K/UL
PLATELET BLD QL SMEAR: ABNORMAL
PMV BLD AUTO: 9.8 FL
RBC # BLD AUTO: 3.69 M/UL
RPR SER QL: NORMAL
WBC # BLD AUTO: 10.59 K/UL

## 2017-12-29 PROCEDURE — 90715 TDAP VACCINE 7 YRS/> IM: CPT | Performed by: OBSTETRICS & GYNECOLOGY

## 2017-12-29 PROCEDURE — 72200004 HC VAGINAL DELIVERY LEVEL I

## 2017-12-29 PROCEDURE — 11000001 HC ACUTE MED/SURG PRIVATE ROOM

## 2017-12-29 PROCEDURE — 25000003 PHARM REV CODE 250: Performed by: OBSTETRICS & GYNECOLOGY

## 2017-12-29 PROCEDURE — 63600175 PHARM REV CODE 636 W HCPCS: Performed by: OBSTETRICS & GYNECOLOGY

## 2017-12-29 PROCEDURE — 0UQMXZZ REPAIR VULVA, EXTERNAL APPROACH: ICD-10-PCS | Performed by: OBSTETRICS & GYNECOLOGY

## 2017-12-29 PROCEDURE — 0HQ9XZZ REPAIR PERINEUM SKIN, EXTERNAL APPROACH: ICD-10-PCS | Performed by: OBSTETRICS & GYNECOLOGY

## 2017-12-29 PROCEDURE — 85025 COMPLETE CBC W/AUTO DIFF WBC: CPT

## 2017-12-29 PROCEDURE — 90471 IMMUNIZATION ADMIN: CPT | Performed by: OBSTETRICS & GYNECOLOGY

## 2017-12-29 PROCEDURE — 36415 COLL VENOUS BLD VENIPUNCTURE: CPT

## 2017-12-29 PROCEDURE — 59409 OBSTETRICAL CARE: CPT | Mod: AT,,, | Performed by: OBSTETRICS & GYNECOLOGY

## 2017-12-29 RX ORDER — HYDROCORTISONE 25 MG/G
CREAM TOPICAL 3 TIMES DAILY PRN
Status: DISCONTINUED | OUTPATIENT
Start: 2017-12-29 | End: 2017-12-31 | Stop reason: HOSPADM

## 2017-12-29 RX ORDER — HYDROCODONE BITARTRATE AND ACETAMINOPHEN 5; 325 MG/1; MG/1
1 TABLET ORAL EVERY 4 HOURS PRN
Status: DISCONTINUED | OUTPATIENT
Start: 2017-12-29 | End: 2017-12-31 | Stop reason: HOSPADM

## 2017-12-29 RX ORDER — IBUPROFEN 600 MG/1
600 TABLET ORAL EVERY 6 HOURS PRN
Status: DISCONTINUED | OUTPATIENT
Start: 2017-12-29 | End: 2017-12-31 | Stop reason: HOSPADM

## 2017-12-29 RX ORDER — HYDROCODONE BITARTRATE AND ACETAMINOPHEN 7.5; 325 MG/1; MG/1
1 TABLET ORAL EVERY 4 HOURS PRN
Status: DISCONTINUED | OUTPATIENT
Start: 2017-12-29 | End: 2017-12-31 | Stop reason: HOSPADM

## 2017-12-29 RX ORDER — DIPHENHYDRAMINE HCL 25 MG
25 CAPSULE ORAL EVERY 4 HOURS PRN
Status: DISCONTINUED | OUTPATIENT
Start: 2017-12-29 | End: 2017-12-31 | Stop reason: HOSPADM

## 2017-12-29 RX ORDER — ONDANSETRON 8 MG/1
8 TABLET, ORALLY DISINTEGRATING ORAL EVERY 8 HOURS PRN
Status: DISCONTINUED | OUTPATIENT
Start: 2017-12-29 | End: 2017-12-31 | Stop reason: HOSPADM

## 2017-12-29 RX ORDER — ACETAMINOPHEN 325 MG/1
650 TABLET ORAL EVERY 6 HOURS PRN
Status: DISCONTINUED | OUTPATIENT
Start: 2017-12-29 | End: 2017-12-31 | Stop reason: HOSPADM

## 2017-12-29 RX ORDER — OXYTOCIN/RINGER'S LACTATE 20/1000 ML
41.65 PLASTIC BAG, INJECTION (ML) INTRAVENOUS CONTINUOUS
Status: ACTIVE | OUTPATIENT
Start: 2017-12-29 | End: 2017-12-29

## 2017-12-29 RX ORDER — ONDANSETRON 2 MG/ML
4 INJECTION INTRAMUSCULAR; INTRAVENOUS EVERY 6 HOURS PRN
Status: DISCONTINUED | OUTPATIENT
Start: 2017-12-29 | End: 2017-12-31 | Stop reason: HOSPADM

## 2017-12-29 RX ORDER — PRENATAL WITH FERROUS FUM AND FOLIC ACID 3080; 920; 120; 400; 22; 1.84; 3; 20; 10; 1; 12; 200; 27; 25; 2 [IU]/1; [IU]/1; MG/1; [IU]/1; MG/1; MG/1; MG/1; MG/1; MG/1; MG/1; UG/1; MG/1; MG/1; MG/1; MG/1
1 TABLET ORAL DAILY
Status: DISCONTINUED | OUTPATIENT
Start: 2017-12-29 | End: 2017-12-31 | Stop reason: HOSPADM

## 2017-12-29 RX ORDER — DIPHENHYDRAMINE HYDROCHLORIDE 50 MG/ML
25 INJECTION INTRAMUSCULAR; INTRAVENOUS EVERY 4 HOURS PRN
Status: DISCONTINUED | OUTPATIENT
Start: 2017-12-29 | End: 2017-12-31 | Stop reason: HOSPADM

## 2017-12-29 RX ORDER — DOCUSATE SODIUM 100 MG/1
200 CAPSULE, LIQUID FILLED ORAL 2 TIMES DAILY PRN
Status: DISCONTINUED | OUTPATIENT
Start: 2017-12-29 | End: 2017-12-31 | Stop reason: HOSPADM

## 2017-12-29 RX ADMIN — IBUPROFEN 600 MG: 600 TABLET, FILM COATED ORAL at 09:12

## 2017-12-29 RX ADMIN — Medication 333 MILLI-UNITS/MIN: at 12:12

## 2017-12-29 RX ADMIN — VITAMIN A, VITAMIN C, VITAMIN D-3, VITAMIN E, VITAMIN B-1, VITAMIN B-2, NIACIN, VITAMIN B-6, CALCIUM, IRON, ZINC, COPPER 1 TABLET: 4000; 120; 400; 22; 1.84; 3; 20; 10; 1; 12; 200; 27; 25; 2 TABLET ORAL at 09:12

## 2017-12-29 RX ADMIN — HYDROCODONE BITARTRATE AND ACETAMINOPHEN 1 TABLET: 5; 325 TABLET ORAL at 04:12

## 2017-12-29 RX ADMIN — IBUPROFEN 600 MG: 600 TABLET, FILM COATED ORAL at 02:12

## 2017-12-29 RX ADMIN — HYDROCODONE BITARTRATE AND ACETAMINOPHEN 1 TABLET: 5; 325 TABLET ORAL at 09:12

## 2017-12-29 RX ADMIN — IBUPROFEN 600 MG: 600 TABLET, FILM COATED ORAL at 03:12

## 2017-12-29 RX ADMIN — CLOSTRIDIUM TETANI TOXOID ANTIGEN (FORMALDEHYDE INACTIVATED), CORYNEBACTERIUM DIPHTHERIAE TOXOID ANTIGEN (FORMALDEHYDE INACTIVATED), BORDETELLA PERTUSSIS TOXOID ANTIGEN (GLUTARALDEHYDE INACTIVATED), BORDETELLA PERTUSSIS FILAMENTOUS HEMAGGLUTININ ANTIGEN (FORMALDEHYDE INACTIVATED), BORDETELLA PERTUSSIS PERTACTIN ANTIGEN, AND BORDETELLA PERTUSSIS FIMBRIAE 2/3 ANTIGEN 0.5 ML: 5; 2; 2.5; 5; 3; 5 INJECTION, SUSPENSION INTRAMUSCULAR at 03:12

## 2017-12-29 NOTE — HPI
Pt is a  @ 38w4d came into triage c/o contractions.  Pt had SROM in triage with light to moderate stained amniotic fluid  On admission she was 2 cm dilated

## 2017-12-29 NOTE — H&P
[facility]  Obstetrics  History & Physical    Patient Name: Martin Huddleston  MRN: [id number]  Admission Date: 2017  Primary Care Provider: Primary Doctor No    Subjective:     Principal Problem: active labor    History of Present Illness:  Pt is a  @ 38w4d came into triage c/o contractions.  Pt had SROM in triage with light to moderate stained amniotic fluid  On admission she was 2 cm dilated    Obstetric HPI:  Patient reports Date/time of onset: 17 contractions, active fetal movement, No vaginal bleeding , Yes loss of fluid.     This pregnancy has been complicated by none    Obstetric History       T3      L3     SAB0   TAB0   Ectopic0   Multiple0   Live Births3       # Outcome Date GA Lbr Be/2nd Weight Sex Delivery Anes PTL Lv   3 Term 17 38w4d  3.71 kg (8 lb 2.9 oz) M Vag-Spont EPI N BREA      Name: VERN, NJ DUNHAM      Apgar1:  5                Apgar5: 9   2 Term 14 40w0d / 00:08 4.072 kg (8 lb 15.6 oz) M Vag-Spont EPI N BREA      Apgar1:  9                Apgar5: 9   1 Term 08/10/12 40w0d   M Vag-Spont EPI N BREA        Past Medical History:   Diagnosis Date    Constipation      Past Surgical History:   Procedure Laterality Date    intestinal surg       as a child    SINUS SURGERY  2017    VAGINAL DELIVERY         PTA Medications   Medication Sig    clobetasol 0.05% (TEMOVATE) 0.05 % Oint ULISES EXT AA BID FOR 14 DAYS    oseltamivir (TAMIFLU) 75 MG capsule Take 1 capsule (75 mg total) by mouth once daily.    prenat.vits,diamante,min-iron-folic (PRENATAL VITAMIN) Tab Take 1 tablet by mouth once daily.    PREPLUS 27 mg iron- 1 mg Tab TK 1 T PO QD    triamcinolone (KENALOG) 0.5 % ointment ULISES EXT TO HANDS BID       Review of patient's allergies indicates:  No Known Allergies     Family History     Problem Relation (Age of Onset)    Colon cancer Father    Diabetes Mother    Hypertension Mother        Social History Main Topics    Smoking status:  Never Smoker    Smokeless tobacco: Never Used    Alcohol use No    Drug use: No    Sexual activity: Yes     Partners: Male     Birth control/ protection: None     Review of Systems   Constitutional: Negative for appetite change, chills and fever.   Respiratory: Negative for shortness of breath.    Cardiovascular: Negative for chest pain.   Gastrointestinal: Negative for abdominal pain, blood in stool, constipation, diarrhea, nausea and vomiting.   Endocrine: Negative for hair loss and hot flashes.   Genitourinary: Negative for decreased libido, dyspareunia, dysuria, genital sores, menorrhagia, menstrual problem, pelvic pain, vaginal discharge, vaginal pain, dysmenorrhea, urinary incontinence and vaginal odor.   Musculoskeletal: Negative for back pain.   Skin:  Negative for rash, no acne and hair changes.   Breast: Negative for breast mass, breast pain, nipple discharge and skin changes     Objective:     Vital Signs (Most Recent):  Temp: 98.2 °F (36.8 °C) (12/28/17 2124)  Pulse: 82 (12/28/17 2234)  Resp: 18 (12/28/17 2124)  BP: 132/76 (12/28/17 2224)  SpO2: 98 % (12/28/17 2234) Vital Signs (24h Range):  Temp:  [98.2 °F (36.8 °C)] 98.2 °F (36.8 °C)  Pulse:  [66-98] 82  Resp:  [18] 18  SpO2:  [87 %-100 %] 98 %  BP: (107-137)/(52-85) 132/76     Weight: 121 kg (266 lb 12.1 oz)  Body mass index is 44.39 kg/m².    FHT: 130 bpm, mod jayce, + accels, with occ mild variable decels, with early decels. Cat 2 (reassuring)  TOCO:  Q 2 minutes    Physical Exam:   Constitutional: She is oriented to person, place, and time. She appears well-developed and well-nourished.    HENT:   Head: Normocephalic and atraumatic.    Eyes: EOM are normal. Pupils are equal, round, and reactive to light.     Cardiovascular: Exam reveals no clubbing and no cyanosis.     Pulmonary/Chest: Effort normal.        Abdominal: Soft. She exhibits no mass. There is no rebound and no guarding. No hernia.   gravid             Musculoskeletal: Normal range of  motion and moves all extremeties.       Neurological: She is alert and oriented to person, place, and time.    Skin: Skin is warm. No cyanosis. Nails show no clubbing.    Psychiatric: She has a normal mood and affect. Her behavior is normal. Thought content normal.       Cervix:  Dilation:  10  Effacement:  100%  Station: 1  Presentation: Vertex     Significant Labs:  Lab Results   Component Value Date    GROUPTRH A POS 2017    HEPBSAG Negative 2017    STREPBCULT No Group B Streptococcus isolated 2017       I have personallly reviewed all pertinent lab results from the last 24 hours.    Assessment/Plan:     27 y.o. female  at 38w4d for:    Pregnancy    Active labor  Expected vaginal delivery            Lucita Asencio MD  Obstetrics  [facility]

## 2017-12-29 NOTE — ANESTHESIA PREPROCEDURE EVALUATION
12/28/2017  Martin Huddleston is a 27 y.o., female   Pre-operative evaluation for * No surgery found *    Martin Huddleston is a 27 y.o. female     Patient Active Problem List   Diagnosis    Morbid obesity with BMI of 40.0-44.9, adult    Pregnancy with one fetus    Pregnancy       Review of patient's allergies indicates:  No Known Allergies    No current facility-administered medications on file prior to encounter.      Current Outpatient Prescriptions on File Prior to Encounter   Medication Sig Dispense Refill    clobetasol 0.05% (TEMOVATE) 0.05 % Oint ULISES EXT AA BID FOR 14 DAYS  3    oseltamivir (TAMIFLU) 75 MG capsule Take 1 capsule (75 mg total) by mouth once daily. 10 capsule 0    prenat.vits,diamante,min-iron-folic (PRENATAL VITAMIN) Tab Take 1 tablet by mouth once daily. 30 each 11    PREPLUS 27 mg iron- 1 mg Tab TK 1 T PO QD  11    triamcinolone (KENALOG) 0.5 % ointment ULISES EXT TO HANDS BID  3       Past Surgical History:   Procedure Laterality Date    intestinal surg       as a child    SINUS SURGERY  02/02/2017    VAGINAL DELIVERY         Social History     Social History    Marital status:      Spouse name: N/A    Number of children: 2    Years of education: 12     Occupational History    Not on file.     Social History Main Topics    Smoking status: Never Smoker    Smokeless tobacco: Never Used    Alcohol use No    Drug use: No    Sexual activity: Yes     Partners: Male     Birth control/ protection: None     Other Topics Concern    Not on file     Social History Narrative    No narrative on file         Vital Signs Range (Last 24H):  Temp:  [36.8 °C (98.2 °F)]   Pulse:  [66-98]   Resp:  [18]   BP: (123-136)/(73-85)   SpO2:  [98 %-100 %]     Body mass index is 44.39 kg/m².    CBC:   Recent Labs      12/28/17 1922   WBC  9.83   RBC  4.11   HGB  10.9*   HCT  32.9*   PLT  209    MCV  80*   MCH  26.5*   MCHC  33.1       Pre-op Assessment    I have reviewed the Patient Summary Reports.      I have reviewed the Medications.     Review of Systems  Anesthesia Hx:  No problems with previous Anesthesia Denies Hx of Anesthetic complications  History of prior surgery of interest to airway management or planning: Denies Family Hx of Anesthesia complications.   Denies Personal Hx of Anesthesia complications.   Social:  Non-Smoker, No Alcohol Use    Hematology/Oncology:  Hematology Normal   Oncology Normal     EENT/Dental:EENT/Dental Normal   Cardiovascular:  Cardiovascular Normal Exercise tolerance: good     Pulmonary:  Pulmonary Normal    Renal/:  Renal/ Normal     Hepatic/GI:  Hepatic/GI Normal    Musculoskeletal:  Musculoskeletal Normal    Neurological:  Neurology Normal    Endocrine:  Endocrine Normal    Dermatological:  Skin Normal        Physical Exam  General:  Well nourished, Morbid Obesity    Airway/Jaw/Neck:  Airway Findings: Mouth Opening: Normal Tongue: Normal  General Airway Assessment: Adult  Mallampati: II  Improves to II with phonation.  TM Distance: 4 - 6 cm  Jaw/Neck Findings:  Neck ROM: Normal ROM      Dental:  Dental Findings: In tact   Chest/Lungs:  Chest/Lungs Findings: Clear to auscultation     Heart/Vascular:  Heart Findings: Rate: Normal  Rhythm: Regular Rhythm        Mental Status:  Mental Status Findings:  Cooperative, Alert and Oriented         Anesthesia Plan  Type of Anesthesia, risks & benefits discussed:  Anesthesia Type:  epidural  Patient's Preference:   Intra-op Monitoring Plan: standard ASA monitors  Intra-op Monitoring Plan Comments:   Post Op Pain Control Plan: epidural analgesia  Post Op Pain Control Plan Comments:   Induction:   IV  Beta Blocker:  Patient is not currently on a Beta-Blocker (No further documentation required).       Informed Consent: Patient understands risks and agrees with Anesthesia plan.  Questions answered. Anesthesia consent signed  with patient.  ASA Score: 3     Day of Surgery Review of History & Physical:    H&P update referred to the provider.         Ready For Surgery From Anesthesia Perspective.      Patient Active Problem List   Diagnosis    Morbid obesity with BMI of 40.0-44.9, adult    Pregnancy with one fetus    Pregnancy     Past Surgical History:   Procedure Laterality Date    intestinal surg       as a child    SINUS SURGERY  02/02/2017    VAGINAL DELIVERY       Lab Results   Component Value Date    WBC 9.83 12/28/2017    HGB 10.9 (L) 12/28/2017    HCT 32.9 (L) 12/28/2017    MCV 80 (L) 12/28/2017     12/28/2017

## 2017-12-29 NOTE — H&P
[facility]  Obstetrics  History & Physical    Patient Name: Martin Huddleston  MRN: [id number]  Admission Date: 2017  Primary Care Provider: Primary Doctor No    Subjective:     Principal Problem:<principal problem not specified>    History of Present Illness:  Pt is a  @ 38w4d came into triage c/o contractions.  Pt had SROM in triage with light to moderate stained amniotic fluid  On admission she was 2 cm dilated    No new subjective & objective note has been filed under this hospital service since the last note was generated.    Assessment/Plan:     27 y.o. female  at 38w4d for:    Pregnancy    Active labor  Expected vaginal delivery            Lucita Asencio MD  Obstetrics  [facility]

## 2017-12-29 NOTE — PROGRESS NOTES
Assessment per flowsheet. SVE performed- see pericalm record. Plan of care reviewed, verbalized understanding. Call light placed in reach, instructed to call as needed.

## 2017-12-29 NOTE — L&D DELIVERY NOTE
Delivery Note:  2017    Pre-Delivery Diagnosis: Term pregnancy    Post-Delivery Diagnosis: Living  infant(s) or Male    Procedure: Spontaneous vaginal delivery or Repair bilateral periurethral and midline superficial midline lacerations spontaneous laceration    Surgeon: Lucita Asencio    Anesthesia: Epidural     Estimated Blood Loss: 573 cc    Specimens: placenta    Complications: none    Findings:  male  Infant Wt: 3710 g  Apgars: 1' 5 / 5' 9    Infant was delivered by controlled vaginal delivery after pushing for approximately 6 minutes. There  a nuchal cord, which was reduced.  There was not a dystocia with delivery.    The placenta was spontaneously and completely expressed. The placenta was intact. Uterine tone was accomplished with uterine massage and intravenous Pitocin. Hemostasis was felt to be excellent.    The resultant periurethral tear and superficial perineal lacerations were repaired in interupted fashion using fine delayed absorbable suture. Hemostasis was excellent. Anatomical restoration was accomplished in layers and tissue approximation was excellent.    There were no other tears after thorough exploration.     The patient was cleaned and recovered in the delivery room with her baby. All sponge, instruments and needle counts were correct and confirmed at the end of the procedure.    Lucita Asencio MD

## 2017-12-29 NOTE — ANESTHESIA POSTPROCEDURE EVALUATION
"Anesthesia Post Evaluation    Patient: Martin Huddleston    Procedure(s) Performed: * No procedures listed *    Final Anesthesia Type: epidural  Patient location during evaluation: labor & delivery  Patient participation: Yes- Able to Participate  Level of consciousness: awake and alert and oriented  Post-procedure vital signs: reviewed and stable  Pain management: adequate  Airway patency: patent  PONV status at discharge: No PONV  Anesthetic complications: no      Cardiovascular status: blood pressure returned to baseline and hemodynamically stable  Respiratory status: unassisted, spontaneous ventilation and room air  Hydration status: euvolemic  Follow-up not needed.        Visit Vitals  /76   Pulse 82   Temp 36.8 °C (98.2 °F) (Oral)   Resp 18   Ht 5' 5" (1.651 m)   Wt 121 kg (266 lb 12.1 oz)   LMP 04/03/2017 (Exact Date)   SpO2 98%   Breastfeeding? Unknown   BMI 44.39 kg/m²       Pain/Raymond Score: No Data Recorded      "

## 2017-12-29 NOTE — SUBJECTIVE & OBJECTIVE
Obstetric HPI:  Patient reports Date/time of onset: 17 contractions, active fetal movement, No vaginal bleeding , Yes loss of fluid.     This pregnancy has been complicated by none    Obstetric History       T3      L3     SAB0   TAB0   Ectopic0   Multiple0   Live Births3       # Outcome Date GA Lbr Be/2nd Weight Sex Delivery Anes PTL Lv   3 Term 17 38w4d  3.71 kg (8 lb 2.9 oz) M Vag-Spont EPI N BREA      Name: VERN, BOY SHEFA      Apgar1:  5                Apgar5: 9   2 Term 14 40w0d / 00:08 4.072 kg (8 lb 15.6 oz) M Vag-Spont EPI N BREA      Apgar1:  9                Apgar5: 9   1 Term 08/10/12 40w0d   M Vag-Spont EPI N BREA        Past Medical History:   Diagnosis Date    Constipation      Past Surgical History:   Procedure Laterality Date    intestinal surg       as a child    SINUS SURGERY  2017    VAGINAL DELIVERY         PTA Medications   Medication Sig    clobetasol 0.05% (TEMOVATE) 0.05 % Oint ULISES EXT AA BID FOR 14 DAYS    oseltamivir (TAMIFLU) 75 MG capsule Take 1 capsule (75 mg total) by mouth once daily.    prenat.vits,diamante,min-iron-folic (PRENATAL VITAMIN) Tab Take 1 tablet by mouth once daily.    PREPLUS 27 mg iron- 1 mg Tab TK 1 T PO QD    triamcinolone (KENALOG) 0.5 % ointment ULISES EXT TO HANDS BID       Review of patient's allergies indicates:  No Known Allergies     Family History     Problem Relation (Age of Onset)    Colon cancer Father    Diabetes Mother    Hypertension Mother        Social History Main Topics    Smoking status: Never Smoker    Smokeless tobacco: Never Used    Alcohol use No    Drug use: No    Sexual activity: Yes     Partners: Male     Birth control/ protection: None     Review of Systems   Constitutional: Negative for appetite change, chills and fever.   Respiratory: Negative for shortness of breath.    Cardiovascular: Negative for chest pain.   Gastrointestinal: Negative for abdominal pain, blood in stool, constipation,  diarrhea, nausea and vomiting.   Endocrine: Negative for hair loss and hot flashes.   Genitourinary: Negative for decreased libido, dyspareunia, dysuria, genital sores, menorrhagia, menstrual problem, pelvic pain, vaginal discharge, vaginal pain, dysmenorrhea, urinary incontinence and vaginal odor.   Musculoskeletal: Negative for back pain.   Skin:  Negative for rash, no acne and hair changes.   Breast: Negative for breast mass, breast pain, nipple discharge and skin changes     Objective:     Vital Signs (Most Recent):  Temp: 98.2 °F (36.8 °C) (12/28/17 2124)  Pulse: 82 (12/28/17 2234)  Resp: 18 (12/28/17 2124)  BP: 132/76 (12/28/17 2224)  SpO2: 98 % (12/28/17 2234) Vital Signs (24h Range):  Temp:  [98.2 °F (36.8 °C)] 98.2 °F (36.8 °C)  Pulse:  [66-98] 82  Resp:  [18] 18  SpO2:  [87 %-100 %] 98 %  BP: (107-137)/(52-85) 132/76     Weight: 121 kg (266 lb 12.1 oz)  Body mass index is 44.39 kg/m².    FHT: 130 bpm, mod jayce, + accels, with occ mild variable decels, with early decels. Cat 2 (reassuring)  TOCO:  Q 2 minutes    Physical Exam:   Constitutional: She is oriented to person, place, and time. She appears well-developed and well-nourished.    HENT:   Head: Normocephalic and atraumatic.    Eyes: EOM are normal. Pupils are equal, round, and reactive to light.     Cardiovascular: Exam reveals no clubbing and no cyanosis.     Pulmonary/Chest: Effort normal.        Abdominal: Soft. She exhibits no mass. There is no rebound and no guarding. No hernia.   gravid             Musculoskeletal: Normal range of motion and moves all extremeties.       Neurological: She is alert and oriented to person, place, and time.    Skin: Skin is warm. No cyanosis. Nails show no clubbing.    Psychiatric: She has a normal mood and affect. Her behavior is normal. Thought content normal.       Cervix:  Dilation:  10  Effacement:  100%  Station: 1  Presentation: Vertex     Significant Labs:  Lab Results   Component Value Date    GROUPTRH A POS  12/28/2017    HEPBSAG Negative 06/07/2017    STREPBCULT No Group B Streptococcus isolated 12/12/2017       I have personallly reviewed all pertinent lab results from the last 24 hours.

## 2017-12-29 NOTE — PROGRESS NOTES
Arrived to unit with c/o ctx q 5 minutes since 1am. Rates pain 9/10. Denies ROM or vaginal bleeding. Reports active fetal movement.  at bedside. Instructed to change into gown.

## 2017-12-29 NOTE — ANESTHESIA PROCEDURE NOTES
Epidural    Patient location during procedure: OB   Reason for block: primary anesthetic   Diagnosis: Active Labor   Start time: 12/28/2017 9:11 PM  Timeout: 12/28/2017 9:08 PM  End time: 12/28/2017 9:15 PM  Surgery related to: Vaginal Delivery  Staffing  Anesthesiologist: SHARON ESPINOZA  Performed: anesthesiologist   Preanesthetic Checklist  Completed: patient identified, site marked, surgical consent, pre-op evaluation, timeout performed, IV checked, risks and benefits discussed, monitors and equipment checked, anesthesia consent given, hand hygiene performed and patient being monitored  Preparation  Patient position: sitting  Prep: ChloraPrep  Patient monitoring: Pulse Ox and Blood Pressure  Epidural  Skin Anesthetic: lidocaine 1%  Skin Wheal: 3 mL  Administration type: continuous  Approach: midline  Interspace: L3-4  Injection technique: BRYAN saline  Needle and Epidural Catheter  Needle type: Tuohy   Needle gauge: 17  Needle length: 3.5 inches  Needle insertion depth: 8 cm  Catheter type: springwound and multi-orifice  Catheter size: 19 G  Catheter at skin depth: 12 cm  Test dose: 3 mL of lidocaine 1.5% with Epi 1-to-200,000  Additional Documentation: incremental injection, negative aspiration for heme and CSF, no paresthesia on injection, no signs/symptoms of IV or SA injection, no significant pain on injection and no significant complaints from patient  Needle localization: anatomical landmarks  Medications:  Bolus administered: 10 mL of 0.25% bupivacaine  Epinephrine added: none  Opioid administered: 100 mcg of   fentanyl  Volume per aspiration: 5 mL  Time between aspirations: 5 minutes  Assessment  Ease of block: easy  Patient's tolerance of the procedure: comfortable throughout block and no complaints

## 2017-12-29 NOTE — LACTATION NOTE
"This note was copied from a baby's chart.  Mother requests to formula feed by bottle this feeding.  Discussed the risk of formula and nipple feeding.  Instructed to continue to breastfeed on cue.  Cont to request formula.  Request honored and formula bottle given to mother.  Reviewed proper formula feeding, demonstrated appropriate technique.  States "understand" and verbalized appropriate recall.  "

## 2017-12-30 PROCEDURE — 25000003 PHARM REV CODE 250: Performed by: OBSTETRICS & GYNECOLOGY

## 2017-12-30 PROCEDURE — 11000001 HC ACUTE MED/SURG PRIVATE ROOM

## 2017-12-30 RX ADMIN — IBUPROFEN 600 MG: 600 TABLET, FILM COATED ORAL at 03:12

## 2017-12-30 RX ADMIN — VITAMIN A, VITAMIN C, VITAMIN D-3, VITAMIN E, VITAMIN B-1, VITAMIN B-2, NIACIN, VITAMIN B-6, CALCIUM, IRON, ZINC, COPPER 1 TABLET: 4000; 120; 400; 22; 1.84; 3; 20; 10; 1; 12; 200; 27; 25; 2 TABLET ORAL at 09:12

## 2017-12-30 RX ADMIN — IBUPROFEN 600 MG: 600 TABLET, FILM COATED ORAL at 05:12

## 2017-12-30 NOTE — LACTATION NOTE
This note was copied from a baby's chart.     12/30/17 1330   Maternal Infant Assessment   Breast Size Issue none   Breast Shape pendulous;round   Breast Density soft   Areola elastic   Infant Assessment   Tongue/Frenulum Symptoms appearance normal   Sucking Reflex present   Rooting Reflex present   Swallow Reflex present   Skin Color pink   LATCH Score   Latch 2-->grasps breast, tongue down, lips flanged, rhythmic sucking   Audible Swallowing 2-->spontaneous and intermittent (24 hrs old)   Type Of Nipple 2-->everted (after stimulation)   Comfort (Breast/Nipple) 2-->soft/nontender   Hold (Positioning) 2-->no assist from staff, mother able to position/hold infant   Score (less than 7 for 2/more consecutive times, consult Lactation Consultant) 10       Number Scale   Presence of Pain denies   Maternal Infant Feeding   Maternal Emotional State independent;relaxed   Infant Positioning cradle   Signs of Milk Transfer audible swallow   Presence of Pain no   Breastfeeding History   Breastfeeding History yes   Previous Exclusive Breastfeeding yes   Previous Breastfeeding Success successful   Infant First Feeding   Breastfeeding Left Side (min) 10 Min   Feeding Infant   Feeding Readiness Cues energy for feeding;hand to mouth movements   Audible Swallow yes   Suck/Swallow Coordination present   Lactation Interventions   Attachment Promotion infant-mother separation minimized;privacy provided;skin-to-skin contact encouraged

## 2017-12-30 NOTE — PLAN OF CARE
Problem: Patient Care Overview  Goal: Plan of Care Review  Outcome: Ongoing (interventions implemented as appropriate)  VSS.  Ambulating and voiding.  Pain controlled with ibuprofen.  Breast and formula feeding infant.  Plan of care reviewed with pt, all questions and concerns addressed.

## 2017-12-30 NOTE — PROGRESS NOTES
Spoke with dr. Aranda, relayed bladder scanner amounts. New orders noted for in and out to confirm amount in bladder. And to re-scan in 4-6 hours after voiding, if urine left in bladder call MD back.

## 2017-12-30 NOTE — PROGRESS NOTES
"Questioned pt. About bladder concern she spoke with dr. albright about, she states "it takes awhile to come out, but I had this issue before I delivered him." asked if only happens while pregnant, and she says "yes."   "

## 2017-12-30 NOTE — PLAN OF CARE
Problem: Patient Care Overview  Goal: Individualization & Mutuality  Outcome: Ongoing (interventions implemented as appropriate)  Pt. tolerating pain with prn motrin and percocet, but she prefers the motrin. Tolerating diet. Enc. Pt. To ambulate today. poc reviewed with pt. Bonding well with infant. Vss. Breastfeeding. Pt. C/o issues voiding recently to MD, see notes and orders.

## 2017-12-30 NOTE — PROGRESS NOTES
Bladder scanned pt. Per dr. lucia order, noted to have >362 on machine, told pt. To attempt to void again and will re-scan.

## 2017-12-30 NOTE — PROGRESS NOTES
Resting with baby; day 1 postpartum, s/p vaginal delivery; periurethral tears. Some difficulty urinating; breastfeeding  VS normal; fundus firm at umbilicus. Extremities nontender  Labs reviewed  Clinically stable.  Scan bladder; recheck cbc in AM

## 2017-12-31 VITALS
OXYGEN SATURATION: 98 % | WEIGHT: 266.75 LBS | DIASTOLIC BLOOD PRESSURE: 73 MMHG | HEART RATE: 84 BPM | HEIGHT: 65 IN | TEMPERATURE: 98 F | BODY MASS INDEX: 44.44 KG/M2 | RESPIRATION RATE: 18 BRPM | SYSTOLIC BLOOD PRESSURE: 125 MMHG

## 2017-12-31 LAB
BASOPHILS # BLD AUTO: 0.01 K/UL
BASOPHILS NFR BLD: 0.1 %
DIFFERENTIAL METHOD: ABNORMAL
EOSINOPHIL # BLD AUTO: 0.1 K/UL
EOSINOPHIL NFR BLD: 2.1 %
ERYTHROCYTE [DISTWIDTH] IN BLOOD BY AUTOMATED COUNT: 14.2 %
HCT VFR BLD AUTO: 29.3 %
HGB BLD-MCNC: 9.1 G/DL
LYMPHOCYTES # BLD AUTO: 2 K/UL
LYMPHOCYTES NFR BLD: 29.4 %
MCH RBC QN AUTO: 25.7 PG
MCHC RBC AUTO-ENTMCNC: 31.1 G/DL
MCV RBC AUTO: 83 FL
MONOCYTES # BLD AUTO: 0.4 K/UL
MONOCYTES NFR BLD: 6.4 %
NEUTROPHILS # BLD AUTO: 4.2 K/UL
NEUTROPHILS NFR BLD: 62 %
PLATELET # BLD AUTO: 173 K/UL
PMV BLD AUTO: 9.3 FL
RBC # BLD AUTO: 3.54 M/UL
WBC # BLD AUTO: 6.7 K/UL

## 2017-12-31 PROCEDURE — 25000003 PHARM REV CODE 250: Performed by: OBSTETRICS & GYNECOLOGY

## 2017-12-31 PROCEDURE — 85025 COMPLETE CBC W/AUTO DIFF WBC: CPT

## 2017-12-31 PROCEDURE — 36415 COLL VENOUS BLD VENIPUNCTURE: CPT

## 2017-12-31 RX ORDER — IBUPROFEN 600 MG/1
600 TABLET ORAL EVERY 6 HOURS PRN
Qty: 20 TABLET | Refills: 2 | Status: SHIPPED | OUTPATIENT
Start: 2017-12-31 | End: 2018-02-09

## 2017-12-31 RX ORDER — HYDROCODONE BITARTRATE AND ACETAMINOPHEN 5; 325 MG/1; MG/1
1 TABLET ORAL EVERY 4 HOURS PRN
Qty: 20 TABLET | Refills: 0 | Status: SHIPPED | OUTPATIENT
Start: 2017-12-31 | End: 2018-02-09

## 2017-12-31 RX ADMIN — IBUPROFEN 600 MG: 600 TABLET, FILM COATED ORAL at 12:12

## 2017-12-31 RX ADMIN — IBUPROFEN 600 MG: 600 TABLET, FILM COATED ORAL at 10:12

## 2017-12-31 RX ADMIN — VITAMIN A, VITAMIN C, VITAMIN D-3, VITAMIN E, VITAMIN B-1, VITAMIN B-2, NIACIN, VITAMIN B-6, CALCIUM, IRON, ZINC, COPPER 1 TABLET: 4000; 120; 400; 22; 1.84; 3; 20; 10; 1; 12; 200; 27; 25; 2 TABLET ORAL at 09:12

## 2017-12-31 NOTE — LACTATION NOTE
This note was copied from a baby's chart.     12/31/17 0910   LATCH Score   Latch 2-->grasps breast, tongue down, lips flanged, rhythmic sucking   Audible Swallowing 2-->spontaneous and intermittent (24 hrs old)   Type Of Nipple 2-->everted (after stimulation)   Comfort (Breast/Nipple) 2-->soft/nontender   Hold (Positioning) 2-->no assist from staff, mother able to position/hold infant   Score (less than 7 for 2/more consecutive times, consult Lactation Consultant) 10   Infant First Feeding   Breastfeeding Right Side (min) 10 Min

## 2017-12-31 NOTE — PROGRESS NOTES
Call placed to Dr Aranda, Informed MD that patient voided 350 ml and scanned for 174 ml and refused catheterization. States to scan bladder once more in am after voiding and prior to discharge.

## 2017-12-31 NOTE — PROGRESS NOTES
"Performed bladder scanner after void, noted to have 344 in bladder. Pt. States "can we wait some more, I don't want that now, I will keep trying."   "

## 2017-12-31 NOTE — PLAN OF CARE
Problem: Patient Care Overview  Goal: Plan of Care Review  Outcome: Ongoing (interventions implemented as appropriate)  VSS. Voiding, ambulating. Good pain management with percocet. Bonding with infant, feeding infant on demand. Verbalizes understanding of plan of care with good recall.

## 2018-01-01 NOTE — DISCHARGE SUMMARY
DATE OF ADMISSION: 2017    DATE OF DISCHARGE: 2017    ADMITTING DIAGNOSIS:  Intrauterine pregnancy at 38 weeks and 4 days, in labor.    DISCHARGE DIAGNOSES:  Intrauterine pregnancy at 38 weeks and 4 days, in labor.    PROCEDURES:  Management of labor, spontaneous vaginal delivery, repair of the   periurethral lacerations.    HOSPITAL COURSE:  The patient is a 27-year-old female  3, para 3,   admitted to the hospital on the above date in labor.  After an uneventful labor,   the patient experienced a spontaneous vaginal delivery under epidural   anesthesia.  Delivery was generally uncomplicated.  The patient did re-incur   bilateral periurethral and midline lacerations, which were repaired with   absorbable suture.  Infant was a male with Apgar scores of 9 and 9.  The early   postpartum course has been generally uncomplicated.  She is currently   breastfeeding.  She complained yesterday initially of some difficulty with   urination.  A bladder scan did reveal approximately 300 mL of retained urine   following voiding; however, subsequent scanning revealed only 175 mL after a 350   mL void.  The patient has no complaints at this time and appears to be voiding   without difficulty.  Her vital signs currently are normal.  Her fundus is firm   below the umbilicus and nontender. The urinary bladder is not palpably   distended.  Extremities are nontender.  Normal lochia.    FINAL ASSESSMENT:  Normal 2-day postpartum exam following vaginal delivery.  The   patient does have a history of depression, eczema as well as recent exposure to   influenza B.  She states she is not currently using the previously prescribed   steroid cream as she is not experiencing any symptoms and is not currently   taking her prescribed oseltamivir for prevention of influenza.  She is concerned   about the effects on the baby.  I have reassured her that this medication is   safe.  The patient states her children are well at this  time and she is not   going to take the medication.    The patient's laboratory studies:  She is Rh positive.  Postpartum hematocrit   and hemoglobin were 29.3 and 9.1 essentially unchanged from 2 days ago.  She is   instructed to maintain pelvic rest, light activity and told to call Dr. Souza's   office for any issues with pain, fever or excessive vaginal bleeding and told to   make an appointment as previously instructed for 1 month and told to maintain   light activity, pelvic rest and to call for any unusual pain, fever or excessive   vaginal bleeding, any difficulty with urination.  She is given a prescription   for Percocet 5/325, #20, one every 4 hours as needed for pain and Motrin 600 mg,   #20, one every 6 hours as needed for cramps or back pain.  She will continue   taking her prenatal vitamins daily for at least 1 month or as long as nursing.      EMMANUEL/SONY  dd: 12/31/2017 12:22:43 (CST)  td: 01/01/2018 00:57:52 (CST)  Doc ID   #[id number]  Job ID #[id number]    CC:

## 2018-01-02 ENCOUNTER — TELEPHONE (OUTPATIENT)
Dept: OBSTETRICS AND GYNECOLOGY | Facility: HOSPITAL | Age: 28
End: 2018-01-02

## 2018-01-03 ENCOUNTER — TELEPHONE (OUTPATIENT)
Dept: OBSTETRICS AND GYNECOLOGY | Facility: HOSPITAL | Age: 28
End: 2018-01-03

## 2018-01-11 ENCOUNTER — TELEPHONE (OUTPATIENT)
Dept: OBSTETRICS AND GYNECOLOGY | Facility: CLINIC | Age: 28
End: 2018-01-11

## 2018-01-11 DIAGNOSIS — K64.9 HEMORRHOIDS, UNSPECIFIED HEMORRHOID TYPE: Primary | ICD-10-CM

## 2018-01-11 NOTE — TELEPHONE ENCOUNTER
----- Message from Liv Katz sent at 1/11/2018  3:53 PM CST -----  Contact: self  Pt states after delivery she has gotten hemorrhoids and the pain will not go away.  [phone].  ---------------------------------------------------------------------  1/11/18 @ 5773 ([facility])  CALL ATTEMPT TO PT UNSUCCESSFUL , MESSAGE LEFT FOR PT TO RETURN CALL TO OFFICE CONCERNING HER C/O HEMORRHOID PAIN. PT REQUESTING SOMETHING FOR PAIN .    MESSAGE FORWARDED TO DR PEREZ TO ORDER HER SOMETHING

## 2018-01-15 RX ORDER — HYDROCORTISONE 1 %
CREAM (GRAM) TOPICAL
Qty: 30 G | Refills: 1 | Status: SHIPPED | OUTPATIENT
Start: 2018-01-15 | End: 2018-02-09

## 2018-02-09 ENCOUNTER — POSTPARTUM VISIT (OUTPATIENT)
Dept: OBSTETRICS AND GYNECOLOGY | Facility: CLINIC | Age: 28
End: 2018-02-09
Payer: MEDICAID

## 2018-02-09 ENCOUNTER — LAB VISIT (OUTPATIENT)
Dept: LAB | Facility: HOSPITAL | Age: 28
End: 2018-02-09
Attending: OBSTETRICS & GYNECOLOGY
Payer: MEDICAID

## 2018-02-09 VITALS
BODY MASS INDEX: 43.34 KG/M2 | SYSTOLIC BLOOD PRESSURE: 136 MMHG | DIASTOLIC BLOOD PRESSURE: 77 MMHG | HEIGHT: 65 IN | WEIGHT: 260.13 LBS

## 2018-02-09 DIAGNOSIS — L68.0 HIRSUTISM: ICD-10-CM

## 2018-02-09 LAB — DHEA-S SERPL-MCNC: 338.5 UG/DL

## 2018-02-09 PROCEDURE — 84402 ASSAY OF FREE TESTOSTERONE: CPT

## 2018-02-09 PROCEDURE — 99213 OFFICE O/P EST LOW 20 MIN: CPT | Mod: PBBFAC | Performed by: OBSTETRICS & GYNECOLOGY

## 2018-02-09 PROCEDURE — 99999 PR PBB SHADOW E&M-EST. PATIENT-LVL III: CPT | Mod: PBBFAC,,, | Performed by: OBSTETRICS & GYNECOLOGY

## 2018-02-09 PROCEDURE — 36415 COLL VENOUS BLD VENIPUNCTURE: CPT

## 2018-02-09 PROCEDURE — 82627 DEHYDROEPIANDROSTERONE: CPT

## 2018-02-09 PROCEDURE — 88175 CYTOPATH C/V AUTO FLUID REDO: CPT

## 2018-02-09 RX ORDER — IBUPROFEN 600 MG/1
600 TABLET ORAL EVERY 6 HOURS PRN
Qty: 60 TABLET | Refills: 1 | Status: SHIPPED | OUTPATIENT
Start: 2018-02-09 | End: 2018-06-08 | Stop reason: SDUPTHER

## 2018-02-09 NOTE — PROGRESS NOTES
Subjective:       Patient ID: Martin Huddleston is a 27 y.o. female.    Chief Complaint:  Postpartum Care      History of Present Illness  HPI  Pt here ~6 weeks following vaginal delivery. She is reporting headaches. They are almost daily. Some days are worse than others. She isn't having any bleeding. She is breast feeding. Bonding well with baby. Denies pp-depression. She desires mirena for contraception -- she will call the office when she would like to get it.     GYN & OB History  No LMP recorded.   Date of Last Pap: No result found    OB History    Para Term  AB Living   3 3 3     3   SAB TAB Ectopic Multiple Live Births         0 3      # Outcome Date GA Lbr Be/2nd Weight Sex Delivery Anes PTL Lv   3 Term 17 38w4d  3.71 kg (8 lb 2.9 oz) M Vag-Spont EPI N BREA   2 Term 14 40w0d / 00:08 4.072 kg (8 lb 15.6 oz) M Vag-Spont EPI N BREA   1 Term 08/10/12 40w0d   M Vag-Spont EPI N BREA          Review of Systems  Review of Systems   Constitutional: Negative for chills and fever.   Respiratory: Negative for shortness of breath.    Cardiovascular: Negative for chest pain.   Gastrointestinal: Negative for abdominal pain, constipation, diarrhea, nausea and vomiting.   Genitourinary: Negative for pelvic pain, vaginal bleeding and vaginal discharge.   Neurological: Positive for headaches.           Objective:    Physical Exam:   Constitutional: She is oriented to person, place, and time. She appears well-developed and well-nourished. No distress.      Neck: Normal range of motion. Neck supple.    Cardiovascular: Normal rate, regular rhythm and normal heart sounds.  Exam reveals no clubbing, no cyanosis and no edema.    No murmur heard.   Pulmonary/Chest: Effort normal and breath sounds normal. No respiratory distress. She has no wheezes. She has no rales.        Abdominal: Soft. Normal appearance and bowel sounds are normal. She exhibits no distension and no mass. There is no tenderness. There is no  rigidity, no rebound and no guarding.     Genitourinary: Vagina normal and uterus normal. There is no rash, tenderness, lesion or injury on the right labia. There is no rash, tenderness, lesion or injury on the left labia. Uterus is not deviated, not enlarged, not fixed and not hosting fibroids. Right adnexum displays no mass, no tenderness and no fullness. Left adnexum displays no mass, no tenderness and no fullness. No erythema, tenderness or bleeding in the vagina. No signs of injury around the vagina. No vaginal discharge found.   Genitourinary Comments: Difficult to visualize the cervix.                Neurological: She is alert and oriented to person, place, and time.    Skin: Skin is warm and dry. No rash noted. No cyanosis. Nails show no clubbing.    Psychiatric: She has a normal mood and affect. Her behavior is normal.          Assessment:        1. Postpartum care following vaginal delivery    2. Hirsutism              Plan:      1. Postpartum care following vaginal delivery  - Routine care.   - Liquid-based pap smear, screening  - She desires mirena for contraception -- she will call the office when she would like to get it.     2. Hirsutism  - DHEA-SULFATE; Future  - Testosterone, free; Future       Follow-up in about 1 year (around 2/9/2019) for Annual exam.

## 2018-02-14 LAB — TESTOST FREE SERPL-MCNC: 7.1 PG/ML

## 2018-02-21 ENCOUNTER — TELEPHONE (OUTPATIENT)
Dept: PHARMACY | Facility: CLINIC | Age: 28
End: 2018-02-21

## 2018-02-23 ENCOUNTER — TELEPHONE (OUTPATIENT)
Dept: OBSTETRICS AND GYNECOLOGY | Facility: CLINIC | Age: 28
End: 2018-02-23

## 2018-02-23 NOTE — TELEPHONE ENCOUNTER
Pt returned call regarding Mirena. Explained that she has a $0.00 copay for Mirena on her pharmacy benefit but she may have a copay for her office visit. She requested pharmacist consultation so will schedule for approximately 3pm today. Will confirm delivery with [facility] staff.

## 2018-02-23 NOTE — TELEPHONE ENCOUNTER
----- Message from Liv Katz sent at 2/23/2018 10:35 AM CST -----  Contact: self  Pt is asking to schedule IUD insertion.  [phone]

## 2018-03-02 ENCOUNTER — TELEPHONE (OUTPATIENT)
Dept: OBSTETRICS AND GYNECOLOGY | Facility: CLINIC | Age: 28
End: 2018-03-02

## 2018-03-06 ENCOUNTER — TELEPHONE (OUTPATIENT)
Dept: OBSTETRICS AND GYNECOLOGY | Facility: CLINIC | Age: 28
End: 2018-03-06

## 2018-03-09 ENCOUNTER — PROCEDURE VISIT (OUTPATIENT)
Dept: OBSTETRICS AND GYNECOLOGY | Facility: CLINIC | Age: 28
End: 2018-03-09
Payer: MEDICAID

## 2018-03-09 VITALS
DIASTOLIC BLOOD PRESSURE: 67 MMHG | HEIGHT: 65 IN | SYSTOLIC BLOOD PRESSURE: 133 MMHG | BODY MASS INDEX: 43.67 KG/M2 | WEIGHT: 262.13 LBS

## 2018-03-09 DIAGNOSIS — Z30.430 ENCOUNTER FOR INSERTION OF MIRENA IUD: Primary | ICD-10-CM

## 2018-03-09 LAB
B-HCG UR QL: NEGATIVE
CTP QC/QA: YES

## 2018-03-09 PROCEDURE — 81025 URINE PREGNANCY TEST: CPT | Mod: PBBFAC | Performed by: OBSTETRICS & GYNECOLOGY

## 2018-03-09 PROCEDURE — 58300 INSERT INTRAUTERINE DEVICE: CPT | Mod: PBBFAC | Performed by: OBSTETRICS & GYNECOLOGY

## 2018-03-09 RX ORDER — METFORMIN HYDROCHLORIDE 500 MG/1
500 TABLET ORAL
Qty: 30 TABLET | Refills: 3 | Status: SHIPPED | OUTPATIENT
Start: 2018-03-09 | End: 2018-06-27 | Stop reason: SDUPTHER

## 2018-03-09 RX ORDER — HYDROCORTISONE 1 %
CREAM (GRAM) TOPICAL
Qty: 30 G | Refills: 1 | Status: SHIPPED | OUTPATIENT
Start: 2018-03-09 | End: 2019-02-01

## 2018-03-09 NOTE — PROCEDURES
INSERTION OF INTRAUTERINE DEVICE  Date/Time: 3/9/2018 11:00 AM  Performed by: JIM PEREZ  Authorized by: JIM PEREZ          PROCEDURE:  Mirena, Lot # [id number], Expiration date 09/2020 insertion    INDICATION: Contraception    PATIENT CONSENT: She was counseled on the risks, benefits, indications, and alternatives to IUD use.  She understands that with insertion there is a risk of bleeding, infection, and uterine perforation.  All questions are answered.  Consents signed.     LABS: UPT is negative.     Cervical cultures were not performed.    ANESTHESIA: NONE    PROCEDURE NOTE:    Time out performed.  The cervix was visualized with a speculum.  A single tooth tenaculum was placed on the anterior lip of the cervix.  The uterus sounds to 8cm using sterile technique.  A Mirena, Lot # [id number], Expiration date 09/2020 was loaded and placed high in the uterine fundus without difficulty using sterile technique.  The string was was then cut.  The tenaculum and speculum were removed.    COMPLICATIONS: None    PATIENT DISPOSITION: The patient tolerated the procedure well.    Assessment:  1.  Contraceptive management/IUD insertion    Post IUD placement counseling:  Manage post IUD placement pain with NSAIDS, Tylenol or Rx per Medcard.  IUD danger signs and how to check for strings were discussed.  The IUD needs to be removed in 5 years for Mirena and 10 years for Copper IUD.    Follow up:  4 weeks for string check      Jim Perez MD 03/09/2018 11:00 AM

## 2018-03-21 ENCOUNTER — PATIENT MESSAGE (OUTPATIENT)
Dept: OBSTETRICS AND GYNECOLOGY | Facility: CLINIC | Age: 28
End: 2018-03-21

## 2018-04-06 ENCOUNTER — OFFICE VISIT (OUTPATIENT)
Dept: OBSTETRICS AND GYNECOLOGY | Facility: CLINIC | Age: 28
End: 2018-04-06
Payer: MEDICAID

## 2018-04-06 VITALS
HEIGHT: 65 IN | BODY MASS INDEX: 42.49 KG/M2 | SYSTOLIC BLOOD PRESSURE: 130 MMHG | WEIGHT: 255 LBS | DIASTOLIC BLOOD PRESSURE: 71 MMHG

## 2018-04-06 DIAGNOSIS — Z30.431 IUD CHECK UP: Primary | ICD-10-CM

## 2018-04-06 PROCEDURE — 99213 OFFICE O/P EST LOW 20 MIN: CPT | Mod: S$PBB,,, | Performed by: OBSTETRICS & GYNECOLOGY

## 2018-04-06 PROCEDURE — 99999 PR PBB SHADOW E&M-EST. PATIENT-LVL III: CPT | Mod: PBBFAC,,, | Performed by: OBSTETRICS & GYNECOLOGY

## 2018-04-06 PROCEDURE — 99213 OFFICE O/P EST LOW 20 MIN: CPT | Mod: PBBFAC | Performed by: OBSTETRICS & GYNECOLOGY

## 2018-04-06 NOTE — PROGRESS NOTES
"CC: IUD check     Shewalter Huddleston is a 27 y.o. female  presents for IUD check.  Mild cramping and bleeding lightly daily since mirena IUD placed.    /71   Ht 5' 5" (1.651 m)   Wt 115.7 kg (255 lb)   LMP 2018   BMI 42.43 kg/m²       ROS:  GENERAL: No fever, chills, fatigability or weight loss.  VULVAR: No pain, no lesions and no itching.  VAGINAL: No relaxation, no itching, no discharge, no abnormal bleeding and no lesions.  ABDOMEN: No abdominal pain. Denies nausea. Denies vomiting. No diarrhea. No constipation  BREAST: Denies pain. No lumps. No discharge.  URINARY: No incontinence, no nocturia, no frequency and no dysuria.  CARDIOVASCULAR: No chest pain. No shortness of breath. No leg cramps.  NEUROLOGICAL: No headaches. No vision changes.    Physical Exam:       PELVIC: Normal external genitalia without lesions.  Normal hair distribution.  Adequate perineal body, normal urethral meatus.  Vagina moist and well rugated without lesions or discharge.  Cervix pink, without lesions, discharge or tenderness.  IUD strings are visible and palpable.  No significant cystocele or rectocele.  Bimanual exam shows uterus to be normal size, regular, mobile and nontender.  Adnexa without masses or tenderness.        1. IUD check up    2.  Counseled bleeding is expected  3.  Continue metformin as previously prescribed by Dr. Souza (her note stated pt likely has PCOS) - notified via my chart.    RTC in 1 year     "

## 2018-04-09 ENCOUNTER — PATIENT MESSAGE (OUTPATIENT)
Dept: OBSTETRICS AND GYNECOLOGY | Facility: CLINIC | Age: 28
End: 2018-04-09

## 2018-06-08 ENCOUNTER — TELEPHONE (OUTPATIENT)
Dept: OBSTETRICS AND GYNECOLOGY | Facility: CLINIC | Age: 28
End: 2018-06-08

## 2018-06-08 RX ORDER — IBUPROFEN 600 MG/1
600 TABLET ORAL EVERY 6 HOURS PRN
Qty: 60 TABLET | Refills: 1 | Status: SHIPPED | OUTPATIENT
Start: 2018-06-08 | End: 2018-12-27 | Stop reason: SDUPTHER

## 2018-06-08 RX ORDER — IBUPROFEN 600 MG/1
600 TABLET ORAL EVERY 6 HOURS PRN
Qty: 60 TABLET | Refills: 1 | Status: SHIPPED | OUTPATIENT
Start: 2018-06-08 | End: 2018-06-27 | Stop reason: SDUPTHER

## 2018-06-22 ENCOUNTER — PATIENT MESSAGE (OUTPATIENT)
Dept: OBSTETRICS AND GYNECOLOGY | Facility: CLINIC | Age: 28
End: 2018-06-22

## 2018-06-27 RX ORDER — IBUPROFEN 600 MG/1
600 TABLET ORAL EVERY 6 HOURS PRN
Qty: 60 TABLET | Refills: 1 | Status: SHIPPED | OUTPATIENT
Start: 2018-06-27

## 2018-06-27 RX ORDER — METFORMIN HYDROCHLORIDE 500 MG/1
500 TABLET ORAL
Qty: 30 TABLET | Refills: 3 | Status: SHIPPED | OUTPATIENT
Start: 2018-06-27 | End: 2018-07-19 | Stop reason: SDUPTHER

## 2018-07-19 ENCOUNTER — TELEPHONE (OUTPATIENT)
Dept: OBSTETRICS AND GYNECOLOGY | Facility: CLINIC | Age: 28
End: 2018-07-19

## 2018-07-19 RX ORDER — METFORMIN HYDROCHLORIDE 500 MG/1
500 TABLET ORAL
Qty: 30 TABLET | Refills: 5 | Status: SHIPPED | OUTPATIENT
Start: 2018-07-19 | End: 2019-03-30 | Stop reason: SDUPTHER

## 2019-01-03 RX ORDER — IBUPROFEN 600 MG/1
TABLET ORAL
Qty: 60 TABLET | Refills: 0 | Status: SHIPPED | OUTPATIENT
Start: 2019-01-03 | End: 2019-08-08 | Stop reason: SDUPTHER

## 2019-01-14 ENCOUNTER — LAB VISIT (OUTPATIENT)
Dept: LAB | Facility: HOSPITAL | Age: 29
End: 2019-01-14
Attending: OBSTETRICS & GYNECOLOGY
Payer: MEDICAID

## 2019-01-14 ENCOUNTER — OFFICE VISIT (OUTPATIENT)
Dept: OBSTETRICS AND GYNECOLOGY | Facility: CLINIC | Age: 29
End: 2019-01-14
Payer: MEDICAID

## 2019-01-14 VITALS
HEART RATE: 89 BPM | RESPIRATION RATE: 15 BRPM | WEIGHT: 262.81 LBS | SYSTOLIC BLOOD PRESSURE: 117 MMHG | BODY MASS INDEX: 43.79 KG/M2 | HEIGHT: 65 IN | DIASTOLIC BLOOD PRESSURE: 73 MMHG

## 2019-01-14 DIAGNOSIS — Z30.431 IUD CHECK UP: ICD-10-CM

## 2019-01-14 DIAGNOSIS — R79.89 ELEVATED TESTOSTERONE LEVEL: ICD-10-CM

## 2019-01-14 DIAGNOSIS — R79.89 ELEVATED TESTOSTERONE LEVEL: Primary | ICD-10-CM

## 2019-01-14 LAB
ESTIMATED AVG GLUCOSE: 105 MG/DL
HBA1C MFR BLD HPLC: 5.3 %

## 2019-01-14 PROCEDURE — 36415 COLL VENOUS BLD VENIPUNCTURE: CPT

## 2019-01-14 PROCEDURE — 99213 PR OFFICE/OUTPT VISIT, EST, LEVL III, 20-29 MIN: ICD-10-PCS | Mod: S$PBB,,, | Performed by: OBSTETRICS & GYNECOLOGY

## 2019-01-14 PROCEDURE — 99999 PR PBB SHADOW E&M-EST. PATIENT-LVL III: ICD-10-PCS | Mod: PBBFAC,,, | Performed by: OBSTETRICS & GYNECOLOGY

## 2019-01-14 PROCEDURE — 83036 HEMOGLOBIN GLYCOSYLATED A1C: CPT

## 2019-01-14 PROCEDURE — 99213 OFFICE O/P EST LOW 20 MIN: CPT | Mod: S$PBB,,, | Performed by: OBSTETRICS & GYNECOLOGY

## 2019-01-14 PROCEDURE — 99999 PR PBB SHADOW E&M-EST. PATIENT-LVL III: CPT | Mod: PBBFAC,,, | Performed by: OBSTETRICS & GYNECOLOGY

## 2019-01-14 PROCEDURE — 99213 OFFICE O/P EST LOW 20 MIN: CPT | Mod: PBBFAC | Performed by: OBSTETRICS & GYNECOLOGY

## 2019-01-14 NOTE — PROGRESS NOTES
SUBJECTIVE:   28 y.o. female   for no period    Patient's last menstrual period was 2018..    Pt had mirena IUD since 3/2018  Has not have a period since 2018 and is worried    Wants to check hormone because she was told to have pcos   started on metformin last year  Further questioning revealed pt with normal period every 20-21 day.  Pt denies increased in male pattern hair growth besides midline abdomen.   Testosterone elevated 7.1 in 2018  Pt never had pelvic US     OB History    Para Term  AB Living   3 3 3     3   SAB TAB Ectopic Multiple Live Births         0 3      # Outcome Date GA Lbr Be/2nd Weight Sex Delivery Anes PTL Lv   3 Term 17 38w4d  3.71 kg (8 lb 2.9 oz) M Vag-Spont EPI N BREA   2 Term 14 40w0d / 00:08 4.072 kg (8 lb 15.6 oz) M Vag-Spont EPI N BREA   1 Term 08/10/12 40w0d   M Vag-Spont EPI N BREA        Past Medical History:   Diagnosis Date    Constipation      Past Surgical History:   Procedure Laterality Date    intestinal surg       as a child    SINUS SURGERY  2017    VAGINAL DELIVERY       Social History     Socioeconomic History    Marital status:      Spouse name: Not on file    Number of children: 2    Years of education: 12    Highest education level: Not on file   Social Needs    Financial resource strain: Not on file    Food insecurity - worry: Not on file    Food insecurity - inability: Not on file    Transportation needs - medical: Not on file    Transportation needs - non-medical: Not on file   Occupational History    Not on file   Tobacco Use    Smoking status: Never Smoker    Smokeless tobacco: Never Used   Substance and Sexual Activity    Alcohol use: No    Drug use: No    Sexual activity: Yes     Partners: Male     Birth control/protection: None   Other Topics Concern    Not on file   Social History Narrative    Not on file     Family History   Problem Relation Age of Onset    Colon cancer Father      "Diabetes Mother     Hypertension Mother          Current Outpatient Medications   Medication Sig Dispense Refill    hydrocortisone 1 % cream Apply to affected area 2 times daily 30 g 1    ibuprofen (ADVIL,MOTRIN) 600 MG tablet Take 1 tablet (600 mg total) by mouth every 6 (six) hours as needed (cramping or back pain). 60 tablet 1    ibuprofen (ADVIL,MOTRIN) 600 MG tablet TAKE 1 TABLET BY MOUTH EVERY 6 HOURS AS NEEDED FOR CRAMPING OR BACK PAIN 60 tablet 0    levonorgestrel (MIRENA) 20 mcg/24 hr (5 years) IUD 1 Intra Uterine Device by Intrauterine route once. 1 each 0    metFORMIN (GLUCOPHAGE) 500 MG tablet Take 1 tablet (500 mg total) by mouth daily with breakfast. 30 tablet 5    prenat.vits,diamante,min-iron-folic (PRENATAL VITAMIN) Tab Take 1 tablet by mouth once daily. 30 each 11    PREPLUS 27 mg iron- 1 mg Tab TK 1 T PO QD  11     No current facility-administered medications for this visit.      Allergies: Patient has no known allergies.       ROS:  GENERAL: Denies weight gain or weight loss. Feeling well overall.   SKIN: Denies rash or lesions.   HEAD: Denies head injury or headache.   NODES: Denies enlarged lymph nodes.   CHEST: Denies chest pain or shortness of breath.   CARDIOVASCULAR: Denies palpitations or left sided chest pain.   ABDOMEN: No abdominal pain, constipation, diarrhea, nausea, vomiting or rectal bleeding.   URINARY: No frequency, dysuria, hematuria, or burning on urination.  REPRODUCTIVE: Denies vaginal discharge, abnormal vaginal bleeding, lesions, pelvic pain  BREASTS: The patient performs breast self-examination and denies pain, lumps, or nipple discharge.   HEMATOLOGIC: No easy bruisability or excessive bleeding.  MUSCULOSKELETAL: Denies joint pain or swelling.   NEUROLOGIC: Denies syncope or weakness.   PSYCHIATRIC: Denies depression, anxiety or mood swings.        OBJECTIVE:   /73   Pulse 89   Resp 15   Ht 5' 5" (1.651 m)   Wt 119.2 kg (262 lb 12.6 oz)   LMP 08/26/2018   " Breastfeeding? No   BMI 43.73 kg/m²   The patient appears well, alert, oriented x 3, in no distress.  NECK: negative, no thyromegaly, trachea midline  SKIN: normal, good color, good turgor and very minimal increased in hair on midline abdomen and chest. No other sign of hirsutism  ABDOMEN: soft, non-tender; bowel sounds normal; no masses,  no organomegaly and no hernias, masses, or hepatosplenomegaly  BLADDER: soft  GENITALIA: normal external genitalia, no erythema, no discharge  URETHRA: normal appearing urethra with no masses, tenderness or lesions and normal urethra, normal urethral meatus  VAGINA: Normal  CERVIX: no lesions or cervical motion tenderness and iud strings seen  UTERUS: normal size, contour, position, consistency, mobility, non-tender  ADNEXA: no mass, fullness, tenderness      ASSESSMENT:   1.  Reassured pt normal to not have menses with mirena  2.  Given pt history, exam does not meet criteria for hirsutism, however she has elevated free testosterone.  She does not meet the [place]ter[place] criteria for PCOS yet - will check pelvic US. Check A1c.

## 2019-01-29 ENCOUNTER — PATIENT MESSAGE (OUTPATIENT)
Dept: OBSTETRICS AND GYNECOLOGY | Facility: CLINIC | Age: 29
End: 2019-01-29

## 2019-01-29 ENCOUNTER — HOSPITAL ENCOUNTER (OUTPATIENT)
Dept: RADIOLOGY | Facility: HOSPITAL | Age: 29
Discharge: HOME | End: 2019-01-29
Attending: OBSTETRICS & GYNECOLOGY
Payer: MEDICAID

## 2019-01-29 DIAGNOSIS — R79.89 ELEVATED TESTOSTERONE LEVEL: ICD-10-CM

## 2019-01-29 PROCEDURE — 76856 US PELVIS COMP WITH TRANSVAG NON-OB (XPD): ICD-10-PCS | Mod: 26,,, | Performed by: RADIOLOGY

## 2019-01-29 PROCEDURE — 76830 US PELVIS COMP WITH TRANSVAG NON-OB (XPD): ICD-10-PCS | Mod: 26,,, | Performed by: RADIOLOGY

## 2019-01-29 PROCEDURE — 76856 US EXAM PELVIC COMPLETE: CPT | Mod: 26,,, | Performed by: RADIOLOGY

## 2019-01-29 PROCEDURE — 76830 TRANSVAGINAL US NON-OB: CPT | Mod: TC

## 2019-01-29 PROCEDURE — 76830 TRANSVAGINAL US NON-OB: CPT | Mod: 26,,, | Performed by: RADIOLOGY

## 2019-02-01 ENCOUNTER — PROCEDURE VISIT (OUTPATIENT)
Dept: OBSTETRICS AND GYNECOLOGY | Facility: CLINIC | Age: 29
End: 2019-02-01
Payer: MEDICAID

## 2019-02-01 VITALS
HEIGHT: 65 IN | HEART RATE: 89 BPM | RESPIRATION RATE: 15 BRPM | BODY MASS INDEX: 44.44 KG/M2 | DIASTOLIC BLOOD PRESSURE: 78 MMHG | SYSTOLIC BLOOD PRESSURE: 130 MMHG | WEIGHT: 266.75 LBS

## 2019-02-01 DIAGNOSIS — Z30.432 ENCOUNTER FOR IUD REMOVAL: ICD-10-CM

## 2019-02-01 PROCEDURE — 58301 REMOVE INTRAUTERINE DEVICE: CPT | Mod: PBBFAC | Performed by: OBSTETRICS & GYNECOLOGY

## 2019-02-01 PROCEDURE — 58301 PR REMOVE, INTRAUTERINE DEVICE: ICD-10-PCS | Mod: S$PBB,,, | Performed by: OBSTETRICS & GYNECOLOGY

## 2019-02-01 PROCEDURE — 58301 REMOVE INTRAUTERINE DEVICE: CPT | Mod: S$PBB,,, | Performed by: OBSTETRICS & GYNECOLOGY

## 2019-02-01 NOTE — PROCEDURES
SUBJECTIVE:   28 y.o. female   for iud removal    No LMP recorded. Patient is not currently having periods (Reason: Birth Control)..    Pt has mirena IUD, desires to get pregnant again        OB History    Para Term  AB Living   3 3 3     3   SAB TAB Ectopic Multiple Live Births         0 3      # Outcome Date GA Lbr Be/2nd Weight Sex Delivery Anes PTL Lv   3 Term 17 38w4d  3.71 kg (8 lb 2.9 oz) M Vag-Spont EPI N BREA   2 Term 14 40w0d / 00:08 4.072 kg (8 lb 15.6 oz) M Vag-Spont EPI N BREA   1 Term 08/10/12 40w0d   M Vag-Spont EPI N BREA      Obstetric Comments   Gynhx: reg every 21 day   PCOS - hirsutism and US     Past Medical History:   Diagnosis Date    Constipation     PCOS (polycystic ovarian syndrome)      Past Surgical History:   Procedure Laterality Date    intestinal surg       as a child    SINUS SURGERY  2017    VAGINAL DELIVERY       Social History     Socioeconomic History    Marital status:      Spouse name: Not on file    Number of children: 2    Years of education: 12    Highest education level: Not on file   Social Needs    Financial resource strain: Not on file    Food insecurity - worry: Not on file    Food insecurity - inability: Not on file    Transportation needs - medical: Not on file    Transportation needs - non-medical: Not on file   Occupational History    Not on file   Tobacco Use    Smoking status: Never Smoker    Smokeless tobacco: Never Used   Substance and Sexual Activity    Alcohol use: No    Drug use: No    Sexual activity: Yes     Partners: Male     Birth control/protection: None   Other Topics Concern    Not on file   Social History Narrative    Not on file     Family History   Problem Relation Age of Onset    Colon cancer Father     Diabetes Mother     Hypertension Mother          Current Outpatient Medications   Medication Sig Dispense Refill    ibuprofen (ADVIL,MOTRIN) 600 MG tablet Take 1 tablet (600 mg  "total) by mouth every 6 (six) hours as needed (cramping or back pain). 60 tablet 1    ibuprofen (ADVIL,MOTRIN) 600 MG tablet TAKE 1 TABLET BY MOUTH EVERY 6 HOURS AS NEEDED FOR CRAMPING OR BACK PAIN 60 tablet 0    metFORMIN (GLUCOPHAGE) 500 MG tablet Take 1 tablet (500 mg total) by mouth daily with breakfast. 30 tablet 5    levonorgestrel (MIRENA) 20 mcg/24 hr (5 years) IUD 1 Intra Uterine Device by Intrauterine route once. 1 each 0    PREPLUS 27 mg iron- 1 mg Tab TK 1 T PO QD  11     No current facility-administered medications for this visit.      Allergies: Patient has no known allergies.       ROS    ROS:  GENERAL: Denies weight gain or weight loss. Feeling well overall.   SKIN: Denies rash or lesions.   HEAD: Denies head injury or headache.   NODES: Denies enlarged lymph nodes.   CHEST: Denies chest pain or shortness of breath.   CARDIOVASCULAR: Denies palpitations or left sided chest pain.   ABDOMEN: No abdominal pain, constipation, diarrhea, nausea, vomiting or rectal bleeding.   URINARY: No frequency, dysuria, hematuria, or burning on urination.  REPRODUCTIVE: + cyclic lower abdominal pain and cramping around menstrual jonathan  BREASTS: The patient performs breast self-examination and denies pain, lumps, or nipple discharge.   HEMATOLOGIC: No easy bruisability or excessive bleeding.  MUSCULOSKELETAL: Denies joint pain or swelling.   NEUROLOGIC: Denies syncope or weakness.   PSYCHIATRIC: Denies depression, anxiety or mood swings.      OBJECTIVE:   /78   Pulse 89   Resp 15   Ht 5' 5" (1.651 m)   Wt 121 kg (266 lb 12.1 oz)   BMI 44.39 kg/m²   The patient appears well, alert, oriented x 3, in no distress.  NECK: negative, no thyromegaly, trachea midline  SKIN: normal, good color, good turgor and no acne, striae, hirsutism  ABDOMEN: soft, non-tender; bowel sounds normal; no masses,  no organomegaly and no hernias, masses, or hepatosplenomegaly  BLADDER: soft  GENITALIA: normal external genitalia, no " erythema, no discharge  URETHRA: normal appearing urethra with no masses, tenderness or lesions and normal urethra, normal urethral meatus  VAGINA: Normal  CERVIX: no lesions or cervical motion tenderness and IUD strings seen and removed without difficulty  UTERUS: normal size, contour, position, consistency, mobility, non-tender  ADNEXA: no mass, fullness, tenderness      ASSESSMENT:   1.  IUD removed, start PNV  2.  PCOS:  Regular menses. Continue metformin

## 2019-03-31 RX ORDER — METFORMIN HYDROCHLORIDE 500 MG/1
TABLET ORAL
Qty: 30 TABLET | Refills: 11 | Status: SHIPPED | OUTPATIENT
Start: 2019-03-31

## 2019-06-21 ENCOUNTER — HOSPITAL ENCOUNTER (EMERGENCY)
Facility: HOSPITAL | Age: 29
Discharge: HOME | End: 2019-06-21
Attending: EMERGENCY MEDICINE
Payer: MEDICAID

## 2019-06-21 VITALS
BODY MASS INDEX: 39.99 KG/M2 | DIASTOLIC BLOOD PRESSURE: 87 MMHG | HEIGHT: 65 IN | SYSTOLIC BLOOD PRESSURE: 133 MMHG | WEIGHT: 240 LBS | TEMPERATURE: 98 F | HEART RATE: 95 BPM | RESPIRATION RATE: 18 BRPM | OXYGEN SATURATION: 97 %

## 2019-06-21 DIAGNOSIS — G89.29 CHRONIC ABDOMINAL PAIN: Primary | ICD-10-CM

## 2019-06-21 DIAGNOSIS — R10.9 CHRONIC ABDOMINAL PAIN: Primary | ICD-10-CM

## 2019-06-21 LAB
ALBUMIN SERPL BCP-MCNC: 3.9 G/DL (ref 3.5–5.2)
ALP SERPL-CCNC: 68 U/L (ref 55–135)
ALT SERPL W/O P-5'-P-CCNC: 16 U/L (ref 10–44)
ANION GAP SERPL CALC-SCNC: 8 MMOL/L (ref 8–16)
AST SERPL-CCNC: 17 U/L (ref 10–40)
B-HCG UR QL: NEGATIVE
BACTERIA #/AREA URNS HPF: ABNORMAL /HPF
BASOPHILS # BLD AUTO: 0.01 K/UL (ref 0–0.2)
BASOPHILS NFR BLD: 0.1 % (ref 0–1.9)
BILIRUB SERPL-MCNC: 0.2 MG/DL (ref 0.1–1)
BILIRUB UR QL STRIP: NEGATIVE
BUN SERPL-MCNC: 10 MG/DL (ref 6–20)
CALCIUM SERPL-MCNC: 9.6 MG/DL (ref 8.7–10.5)
CHLORIDE SERPL-SCNC: 103 MMOL/L (ref 95–110)
CLARITY UR: CLEAR
CO2 SERPL-SCNC: 27 MMOL/L (ref 23–29)
COLOR UR: YELLOW
CREAT SERPL-MCNC: 0.6 MG/DL (ref 0.5–1.4)
CTP QC/QA: YES
DIFFERENTIAL METHOD: ABNORMAL
EOSINOPHIL # BLD AUTO: 0.1 K/UL (ref 0–0.5)
EOSINOPHIL NFR BLD: 1.3 % (ref 0–8)
ERYTHROCYTE [DISTWIDTH] IN BLOOD BY AUTOMATED COUNT: 14 % (ref 11.5–14.5)
EST. GFR  (AFRICAN AMERICAN): >60 ML/MIN/1.73 M^2
EST. GFR  (NON AFRICAN AMERICAN): >60 ML/MIN/1.73 M^2
GLUCOSE SERPL-MCNC: 97 MG/DL (ref 70–110)
GLUCOSE UR QL STRIP: NEGATIVE
HCT VFR BLD AUTO: 35.1 % (ref 37–48.5)
HGB BLD-MCNC: 11.2 G/DL (ref 12–16)
HGB UR QL STRIP: NEGATIVE
KETONES UR QL STRIP: NEGATIVE
LEUKOCYTE ESTERASE UR QL STRIP: ABNORMAL
LIPASE SERPL-CCNC: 21 U/L (ref 4–60)
LYMPHOCYTES # BLD AUTO: 2.6 K/UL (ref 1–4.8)
LYMPHOCYTES NFR BLD: 24.8 % (ref 18–48)
MCH RBC QN AUTO: 26.7 PG (ref 27–31)
MCHC RBC AUTO-ENTMCNC: 31.9 G/DL (ref 32–36)
MCV RBC AUTO: 84 FL (ref 82–98)
MICROSCOPIC COMMENT: ABNORMAL
MONOCYTES # BLD AUTO: 0.4 K/UL (ref 0.3–1)
MONOCYTES NFR BLD: 4.1 % (ref 4–15)
NEUTROPHILS # BLD AUTO: 7.2 K/UL (ref 1.8–7.7)
NEUTROPHILS NFR BLD: 69.7 % (ref 38–73)
NITRITE UR QL STRIP: NEGATIVE
PH UR STRIP: 7 [PH] (ref 5–8)
PLATELET # BLD AUTO: 242 K/UL (ref 150–350)
PMV BLD AUTO: 9.5 FL (ref 9.2–12.9)
POTASSIUM SERPL-SCNC: 3.8 MMOL/L (ref 3.5–5.1)
PROT SERPL-MCNC: 7.4 G/DL (ref 6–8.4)
PROT UR QL STRIP: NEGATIVE
RBC # BLD AUTO: 4.2 M/UL (ref 4–5.4)
RBC #/AREA URNS HPF: 3 /HPF (ref 0–4)
SODIUM SERPL-SCNC: 138 MMOL/L (ref 136–145)
SP GR UR STRIP: 1.01 (ref 1–1.03)
SQUAMOUS #/AREA URNS HPF: 8 /HPF
URN SPEC COLLECT METH UR: ABNORMAL
UROBILINOGEN UR STRIP-ACNC: NEGATIVE EU/DL
WBC # BLD AUTO: 10.33 K/UL (ref 3.9–12.7)
WBC #/AREA URNS HPF: 20 /HPF (ref 0–5)

## 2019-06-21 PROCEDURE — 83690 ASSAY OF LIPASE: CPT

## 2019-06-21 PROCEDURE — 81025 URINE PREGNANCY TEST: CPT | Performed by: PHYSICIAN ASSISTANT

## 2019-06-21 PROCEDURE — 87086 URINE CULTURE/COLONY COUNT: CPT

## 2019-06-21 PROCEDURE — 80053 COMPREHEN METABOLIC PANEL: CPT

## 2019-06-21 PROCEDURE — 81000 URINALYSIS NONAUTO W/SCOPE: CPT

## 2019-06-21 PROCEDURE — 99283 EMERGENCY DEPT VISIT LOW MDM: CPT

## 2019-06-21 PROCEDURE — 85025 COMPLETE CBC W/AUTO DIFF WBC: CPT

## 2019-06-22 NOTE — ED PROVIDER NOTES
"Encounter Date: 6/21/2019  SORT:   29 y/o female with history of PCOS presenting for evaluation of lower abdominal pain "normally everyday but worse today." Pt unaware of diagnosis with PCOS according to chart review. Reports  chronic pain and worse today. Denies nausea, vomiting, diarrhea. Initial orders placed. JERRELL Gar PA-C      History     Chief Complaint   Patient presents with    Abdominal Pain     Patient presents to the ER with son via personal vehicle. Patient presents with lower abdominal pain (burning). reports she always has pain but today it is worse. reports SOB.      Patient is a 28-year-old female with a history of PCOS presenting to the ER for evaluation of abdominal pain. Patient states that she has abdominal discomfort almost on a daily basis.  She states that today the pain worsened.  She states that the pain is located on her flank region bilaterally. She states that she was told that she has some kidney issues and is being referred to a specialist.  Patient states that today she noticed some discomfort to her flank regions while urinating.  She denies any dysuria or hematuria otherwise.  No vaginal discharge or bleeding.  Denies any nausea, vomiting, fevers or chills.  She has had intestinal surgery as a child.  She has not taken any medication for symptoms.    The history is provided by the patient.     Review of patient's allergies indicates:  No Known Allergies  Past Medical History:   Diagnosis Date    Constipation     PCOS (polycystic ovarian syndrome)      Past Surgical History:   Procedure Laterality Date    intestinal surg       as a child    SINUS SURGERY  02/02/2017    VAGINAL DELIVERY       Family History   Problem Relation Age of Onset    Colon cancer Father     Diabetes Mother     Hypertension Mother      Social History     Tobacco Use    Smoking status: Never Smoker    Smokeless tobacco: Never Used   Substance Use Topics    Alcohol use: No    Drug use: No "     Review of Systems   Constitutional: Negative for chills and fever.   HENT: Negative for congestion.    Respiratory: Negative for cough and shortness of breath.    Cardiovascular: Negative for chest pain and palpitations.   Gastrointestinal: Negative for abdominal pain, diarrhea, nausea and vomiting.   Genitourinary: Positive for flank pain. Negative for dysuria, frequency, hematuria, pelvic pain, urgency, vaginal bleeding and vaginal discharge.   Skin: Negative for rash.   Allergic/Immunologic: Negative for immunocompromised state.   Neurological: Negative for weakness.   Hematological: Does not bruise/bleed easily.   Psychiatric/Behavioral: Negative for confusion.       Physical Exam     Initial Vitals [06/21/19 2056]   BP Pulse Resp Temp SpO2   (!) 147/93 96 18 98.8 °F (37.1 °C) 98 %      MAP       --         Physical Exam    Vitals reviewed.  Constitutional: She appears well-developed and well-nourished. She is not diaphoretic. No distress.   HENT:   Head: Normocephalic and atraumatic.   Mouth/Throat: Oropharynx is clear and moist.   Eyes: Conjunctivae and EOM are normal.   Neck: Neck supple.   Cardiovascular: Normal rate, regular rhythm, normal heart sounds and intact distal pulses.   Pulmonary/Chest: Breath sounds normal.   Abdominal: Soft. Normal appearance. She exhibits no distension. There is tenderness (Bilateral flank). There is no rigidity, no rebound, no guarding and no CVA tenderness.   Musculoskeletal:        Lumbar back: She exhibits tenderness (Tenderness to paraspinal muscles of lumbar spine). She exhibits no bony tenderness.   Neurological: She is alert and oriented to person, place, and time.   Skin: Skin is warm and dry.         ED Course   Procedures  Labs Reviewed   URINALYSIS, REFLEX TO URINE CULTURE - Abnormal; Notable for the following components:       Result Value    Leukocytes, UA Trace (*)     All other components within normal limits    Narrative:     Preferred Collection  Type->Urine, Clean Catch   CBC W/ AUTO DIFFERENTIAL - Abnormal; Notable for the following components:    Hemoglobin 11.2 (*)     Hematocrit 35.1 (*)     Mean Corpuscular Hemoglobin 26.7 (*)     Mean Corpuscular Hemoglobin Conc 31.9 (*)     All other components within normal limits   URINALYSIS MICROSCOPIC - Abnormal; Notable for the following components:    WBC, UA 20 (*)     Bacteria Moderate (*)     All other components within normal limits    Narrative:     Preferred Collection Type->Urine, Clean Catch   CULTURE, URINE   COMPREHENSIVE METABOLIC PANEL   LIPASE   POCT URINE PREGNANCY          Imaging Results    None                APC / Resident Notes:   Patient was seen in the ER promptly upon arrival.  She is afebrile, no acute distress.  Examination reveals minimal tenderness on palpation to bilateral flank region.  Abdomen is otherwise soft, nondistended nontender. No true CVA tenderness noted on exam.    Laboratory studies show normal white count of 10.3.  Hemoglobin stable. Chemistries were found to be unremarkable, normal liver and kidney functions.  Lipase normal. Urinalysis shows 20 WBCs otherwise unremarkable. Negative nitrite, trace leukocytes.  Given the patient is asymptomatic will hold off on antibiotic treatment until culture results.  Patient was agreeable to this treatment plan.    Serial abdominal exams were unremarkable.  Do not feel the patient requires CT at this time.  Patient does report that this pain is similar to which she has on a daily basis.  This may be secondary to her history of PCOS.  Advised her to follow up with family doctor or return to the ER for symptoms persist or worsen.  She is stable for discharge and close follow-up.                 Clinical Impression:       ICD-10-CM ICD-9-CM   1. Chronic abdominal pain R10.9 789.00    G89.29 338.29         Disposition:   Disposition: Discharged  Condition: Stable                        Janis Sanchez PA-C  06/21/19 0025

## 2019-06-22 NOTE — DISCHARGE INSTRUCTIONS
Please follow-up with your family doctor regarding the chronic abdominal pain. Return to the ER if your symptoms persist or worsen

## 2019-06-23 LAB — BACTERIA UR CULT: NORMAL

## 2019-08-08 RX ORDER — IBUPROFEN 600 MG/1
TABLET ORAL
Qty: 60 TABLET | Refills: 0 | Status: SHIPPED | OUTPATIENT
Start: 2019-08-08 | End: 2019-08-21 | Stop reason: SDUPTHER

## 2019-08-21 RX ORDER — IBUPROFEN 600 MG/1
TABLET ORAL
Qty: 60 TABLET | Refills: 0 | Status: SHIPPED | OUTPATIENT
Start: 2019-08-21 | End: 2019-09-24 | Stop reason: SDUPTHER

## 2019-09-24 RX ORDER — IBUPROFEN 600 MG/1
TABLET ORAL
Qty: 60 TABLET | Refills: 0 | Status: SHIPPED | OUTPATIENT
Start: 2019-09-24 | End: 2019-10-05 | Stop reason: SDUPTHER

## 2019-10-06 RX ORDER — IBUPROFEN 600 MG/1
TABLET ORAL
Qty: 60 TABLET | Refills: 0 | Status: SHIPPED | OUTPATIENT
Start: 2019-10-06

## 2021-02-23 ENCOUNTER — OFFICE VISIT (OUTPATIENT)
Dept: OBSTETRICS AND GYNECOLOGY | Facility: CLINIC | Age: 31
End: 2021-02-23
Payer: MEDICAID

## 2021-02-23 VITALS
BODY MASS INDEX: 44.85 KG/M2 | SYSTOLIC BLOOD PRESSURE: 118 MMHG | DIASTOLIC BLOOD PRESSURE: 76 MMHG | HEIGHT: 65 IN | WEIGHT: 269.19 LBS

## 2021-02-23 DIAGNOSIS — Z01.419 VISIT FOR GYNECOLOGIC EXAMINATION: Primary | ICD-10-CM

## 2021-02-23 DIAGNOSIS — R10.2 PELVIC PAIN: ICD-10-CM

## 2021-02-23 DIAGNOSIS — Z30.011 ENCOUNTER FOR INITIAL PRESCRIPTION OF CONTRACEPTIVE PILLS: ICD-10-CM

## 2021-02-23 DIAGNOSIS — N39.0 RECURRENT UTI: ICD-10-CM

## 2021-02-23 DIAGNOSIS — N94.6 DYSMENORRHEA: ICD-10-CM

## 2021-02-23 PROCEDURE — 87624 HPV HI-RISK TYP POOLED RSLT: CPT

## 2021-02-23 PROCEDURE — 99999 PR PBB SHADOW E&M-EST. PATIENT-LVL III: CPT | Mod: PBBFAC,,, | Performed by: OBSTETRICS & GYNECOLOGY

## 2021-02-23 PROCEDURE — 88175 CYTOPATH C/V AUTO FLUID REDO: CPT

## 2021-02-23 PROCEDURE — 99395 PREV VISIT EST AGE 18-39: CPT | Mod: S$PBB,,, | Performed by: OBSTETRICS & GYNECOLOGY

## 2021-02-23 PROCEDURE — 99999 PR PBB SHADOW E&M-EST. PATIENT-LVL III: ICD-10-PCS | Mod: PBBFAC,,, | Performed by: OBSTETRICS & GYNECOLOGY

## 2021-02-23 PROCEDURE — 99213 OFFICE O/P EST LOW 20 MIN: CPT | Mod: PBBFAC | Performed by: OBSTETRICS & GYNECOLOGY

## 2021-02-23 PROCEDURE — 99395 PR PREVENTIVE VISIT,EST,18-39: ICD-10-PCS | Mod: S$PBB,,, | Performed by: OBSTETRICS & GYNECOLOGY

## 2021-02-23 RX ORDER — NORETHINDRONE ACETATE AND ETHINYL ESTRADIOL .02; 1 MG/1; MG/1
1 TABLET ORAL DAILY
Qty: 28 TABLET | Refills: 12 | Status: SHIPPED | OUTPATIENT
Start: 2021-02-23 | End: 2021-03-25

## 2021-03-04 LAB
CLINICAL INFO: NORMAL
CYTO CVX: NORMAL
CYTOLOGIST CVX/VAG CYTO: NORMAL
CYTOLOGY CMNT CVX/VAG CYTO-IMP: NORMAL
CYTOLOGY PAP THIN PREP EXPLANATION: NORMAL
DATE OF PREVIOUS PAP: NORMAL
DATE PREVIOUS BX: NO
HPV I/H RISK 4 DNA CVX QL NAA+PROBE: NOT DETECTED
LMP START DATE: NORMAL
SPECIMEN SOURCE CVX/VAG CYTO: NORMAL
STAT OF ADQ CVX/VAG CYTO-IMP: NORMAL

## 2021-03-07 ENCOUNTER — PATIENT MESSAGE (OUTPATIENT)
Dept: OBSTETRICS AND GYNECOLOGY | Facility: CLINIC | Age: 31
End: 2021-03-07

## 2021-04-13 ENCOUNTER — HOSPITAL ENCOUNTER (EMERGENCY)
Facility: HOSPITAL | Age: 31
Discharge: HOME | End: 2021-04-13
Attending: EMERGENCY MEDICINE
Payer: MEDICAID

## 2021-04-13 VITALS
HEIGHT: 65 IN | RESPIRATION RATE: 20 BRPM | TEMPERATURE: 99 F | SYSTOLIC BLOOD PRESSURE: 121 MMHG | OXYGEN SATURATION: 99 % | DIASTOLIC BLOOD PRESSURE: 65 MMHG | HEART RATE: 81 BPM | WEIGHT: 264.56 LBS | BODY MASS INDEX: 44.08 KG/M2

## 2021-04-13 DIAGNOSIS — S29.012A STRAIN OF THORACIC BACK REGION: Primary | ICD-10-CM

## 2021-04-13 LAB
B-HCG UR QL: NEGATIVE
BACTERIA #/AREA URNS HPF: NORMAL /HPF
BILIRUB UR QL STRIP: NEGATIVE
CLARITY UR: CLEAR
COLOR UR: YELLOW
CTP QC/QA: YES
GLUCOSE UR QL STRIP: NEGATIVE
HGB UR QL STRIP: NEGATIVE
HYALINE CASTS #/AREA URNS LPF: 0 /LPF
KETONES UR QL STRIP: ABNORMAL
LEUKOCYTE ESTERASE UR QL STRIP: ABNORMAL
MICROSCOPIC COMMENT: NORMAL
NITRITE UR QL STRIP: NEGATIVE
PH UR STRIP: 6 [PH] (ref 5–8)
PROT UR QL STRIP: ABNORMAL
RBC #/AREA URNS HPF: 4 /HPF (ref 0–4)
SP GR UR STRIP: 1.03 (ref 1–1.03)
SQUAMOUS #/AREA URNS HPF: 8 /HPF
URN SPEC COLLECT METH UR: ABNORMAL
UROBILINOGEN UR STRIP-ACNC: NEGATIVE EU/DL
WBC #/AREA URNS HPF: 2 /HPF (ref 0–5)

## 2021-04-13 PROCEDURE — 63600175 PHARM REV CODE 636 W HCPCS: Performed by: PHYSICIAN ASSISTANT

## 2021-04-13 PROCEDURE — 25000003 PHARM REV CODE 250: Performed by: PHYSICIAN ASSISTANT

## 2021-04-13 PROCEDURE — 96372 THER/PROPH/DIAG INJ SC/IM: CPT

## 2021-04-13 PROCEDURE — 81025 URINE PREGNANCY TEST: CPT | Performed by: PHYSICIAN ASSISTANT

## 2021-04-13 PROCEDURE — 81000 URINALYSIS NONAUTO W/SCOPE: CPT | Performed by: PHYSICIAN ASSISTANT

## 2021-04-13 PROCEDURE — 99284 EMERGENCY DEPT VISIT MOD MDM: CPT | Mod: 25

## 2021-04-13 RX ORDER — KETOROLAC TROMETHAMINE 30 MG/ML
15 INJECTION, SOLUTION INTRAMUSCULAR; INTRAVENOUS
Status: COMPLETED | OUTPATIENT
Start: 2021-04-13 | End: 2021-04-13

## 2021-04-13 RX ORDER — METHOCARBAMOL 500 MG/1
1000 TABLET, FILM COATED ORAL 3 TIMES DAILY PRN
Qty: 20 TABLET | Refills: 0 | Status: SHIPPED | OUTPATIENT
Start: 2021-04-13 | End: 2021-04-18

## 2021-04-13 RX ORDER — ACETAMINOPHEN 325 MG/1
650 TABLET ORAL
Status: COMPLETED | OUTPATIENT
Start: 2021-04-13 | End: 2021-04-13

## 2021-04-13 RX ADMIN — KETOROLAC TROMETHAMINE 15 MG: 30 INJECTION, SOLUTION INTRAMUSCULAR; INTRAVENOUS at 11:04

## 2021-04-13 RX ADMIN — ACETAMINOPHEN 650 MG: 325 TABLET ORAL at 10:04

## 2021-12-15 ENCOUNTER — PATIENT MESSAGE (OUTPATIENT)
Dept: OBSTETRICS AND GYNECOLOGY | Facility: CLINIC | Age: 31
End: 2021-12-15
Payer: MEDICAID

## 2022-10-26 ENCOUNTER — OFFICE VISIT (OUTPATIENT)
Dept: OBSTETRICS AND GYNECOLOGY | Facility: CLINIC | Age: 32
End: 2022-10-26
Payer: MEDICAID

## 2022-10-26 VITALS
DIASTOLIC BLOOD PRESSURE: 78 MMHG | BODY MASS INDEX: 45.12 KG/M2 | SYSTOLIC BLOOD PRESSURE: 120 MMHG | WEIGHT: 271.19 LBS

## 2022-10-26 DIAGNOSIS — Z11.3 SCREEN FOR STD (SEXUALLY TRANSMITTED DISEASE): ICD-10-CM

## 2022-10-26 DIAGNOSIS — E28.2 PCOS (POLYCYSTIC OVARIAN SYNDROME): ICD-10-CM

## 2022-10-26 DIAGNOSIS — Z30.09 FAMILY PLANNING: Primary | ICD-10-CM

## 2022-10-26 DIAGNOSIS — Z30.09 ENCOUNTER FOR COUNSELING REGARDING CONTRACEPTION: ICD-10-CM

## 2022-10-26 DIAGNOSIS — Z01.419 WELL WOMAN EXAM WITH ROUTINE GYNECOLOGICAL EXAM: ICD-10-CM

## 2022-10-26 PROCEDURE — 3078F PR MOST RECENT DIASTOLIC BLOOD PRESSURE < 80 MM HG: ICD-10-PCS | Mod: CPTII,,, | Performed by: OBSTETRICS & GYNECOLOGY

## 2022-10-26 PROCEDURE — 99999 PR PBB SHADOW E&M-EST. PATIENT-LVL I: ICD-10-PCS | Mod: PBBFAC,,, | Performed by: OBSTETRICS & GYNECOLOGY

## 2022-10-26 PROCEDURE — 99211 OFF/OP EST MAY X REQ PHY/QHP: CPT | Mod: PBBFAC | Performed by: OBSTETRICS & GYNECOLOGY

## 2022-10-26 PROCEDURE — 3078F DIAST BP <80 MM HG: CPT | Mod: CPTII,,, | Performed by: OBSTETRICS & GYNECOLOGY

## 2022-10-26 PROCEDURE — 3074F SYST BP LT 130 MM HG: CPT | Mod: CPTII,,, | Performed by: OBSTETRICS & GYNECOLOGY

## 2022-10-26 PROCEDURE — 99999 PR PBB SHADOW E&M-EST. PATIENT-LVL I: CPT | Mod: PBBFAC,,, | Performed by: OBSTETRICS & GYNECOLOGY

## 2022-10-26 PROCEDURE — 99395 PR PREVENTIVE VISIT,EST,18-39: ICD-10-PCS | Mod: S$PBB,,, | Performed by: OBSTETRICS & GYNECOLOGY

## 2022-10-26 PROCEDURE — 3074F PR MOST RECENT SYSTOLIC BLOOD PRESSURE < 130 MM HG: ICD-10-PCS | Mod: CPTII,,, | Performed by: OBSTETRICS & GYNECOLOGY

## 2022-10-26 PROCEDURE — 99395 PREV VISIT EST AGE 18-39: CPT | Mod: S$PBB,,, | Performed by: OBSTETRICS & GYNECOLOGY

## 2022-10-26 NOTE — PROGRESS NOTES
SUBJECTIVE:   Martin Huddleston is a 31 y.o. female   for annual well woman exam. No LMP recorded..        Contraception: condoms    [place] then experience stroke like symptoms with hemiparaplegia, PCP is working up for possible stroke.  Her symptoms have now resolved    Past Medical History:   Diagnosis Date    Constipation     PCOS (polycystic ovarian syndrome)      Past Surgical History:   Procedure Laterality Date    intestinal surg       as a child    SINUS SURGERY  2017    VAGINAL DELIVERY       Social History     Socioeconomic History    Marital status:     Number of children: 2    Years of education: 12   Tobacco Use    Smoking status: Never    Smokeless tobacco: Never   Substance and Sexual Activity    Alcohol use: No    Drug use: No    Sexual activity: Yes     Partners: Male     Birth control/protection: None     Family History   Problem Relation Age of Onset    Colon cancer Father     Diabetes Mother     Hypertension Mother      OB History    Para Term  AB Living   3 3 3     3   SAB IAB Ectopic Multiple Live Births         0 3      # Outcome Date GA Lbr Be/2nd Weight Sex Delivery Anes PTL Lv   3 Term 17 38w4d  3.71 kg (8 lb 2.9 oz) M Vag-Spont EPI N BREA   2 Term 14 40w0d / 00:08 4.072 kg (8 lb 15.6 oz) M Vag-Spont EPI N BREA   1 Term 08/10/12 40w0d   M Vag-Spont EPI N BREA      Obstetric Comments   Gynhx: reg every 21 day   PCOS - hirsutism and US   Pap neg 2021         Current Outpatient Medications   Medication Sig Dispense Refill    ibuprofen (ADVIL,MOTRIN) 600 MG tablet Take 1 tablet (600 mg total) by mouth every 6 (six) hours as needed (cramping or back pain). (Patient not taking: Reported on 2021) 60 tablet 1    ibuprofen (ADVIL,MOTRIN) 600 MG tablet TAKE 1 TABLET BY MOUTH EVERY 6 HOURS AS NEEDED FOR CRAMPING OR BACK PAIN (Patient not taking: Reported on 2021) 60 tablet 0    levonorgestrel (MIRENA) 20 mcg/24  hr (5 years) IUD 1 Intra Uterine Device by Intrauterine route once. 1 each 0    metFORMIN (GLUCOPHAGE) 500 MG tablet TAKE 1 TABLET(500 MG) BY MOUTH DAILY WITH BREAKFAST (Patient not taking: Reported on 2/23/2021) 30 tablet 11    norethindrone-ethinyl estradiol (LOESTRIN 1/20, 21,) 1-20 mg-mcg per tablet Take 1 tablet by mouth once daily. 28 tablet 12    PREPLUS 27 mg iron- 1 mg Tab TK 1 T PO QD  11     No current facility-administered medications for this visit.     Allergies: Patient has no known allergies.       ROS:  GENERAL: Denies weight gain or weight loss. Feeling well overall.   SKIN: Denies rash or lesions.   HEAD: Denies head injury or headache.   NODES: Denies enlarged lymph nodes.   CHEST: Denies chest pain or shortness of breath.   CARDIOVASCULAR: Denies palpitations or left sided chest pain.   ABDOMEN: No abdominal pain, constipation, diarrhea, nausea, vomiting or rectal bleeding.   URINARY: No frequency, dysuria, hematuria, or burning on urination.  REPRODUCTIVE: Denies vaginal discharge, abnormal vaginal bleeding, lesions, pelvic pain  BREASTS: The patient performs breast self-examination and denies pain, lumps, or nipple discharge.   HEMATOLOGIC: No easy bruisability or excessive bleeding.  MUSCULOSKELETAL: Denies joint pain or swelling.   NEUROLOGIC: Denies syncope or weakness.   PSYCHIATRIC: Denies depression, anxiety or mood swings.      OBJECTIVE:   /78   Wt 123 kg (271 lb 2.7 oz)   LMP 10/09/2022 (Exact Date)   BMI 45.12 kg/m²   The patient appears well, alert, oriented x 3, in no distress.  PSYCH:  Normal, full range of affect  NECK: negative, no thyromegaly, trachea midline  SKIN: normal, good color, good turgor and no acne, striae, hirsutism  BREAST EXAM: breasts appear normal, no suspicious masses, no skin or nipple changes or axillary nodes  ABDOMEN: soft, non-tender; bowel sounds normal; no masses,  no organomegaly and no hernias, masses, or hepatosplenomegaly  GENITALIA: normal  external genitalia, no erythema, no discharge  BLADDER: soft  URETHRA: normal urethra, normal urethral meatus  VAGINA: Normal  CERVIX: no lesions or cervical motion tenderness  UTERUS: normal size, contour, position, consistency, mobility, non-tender  ADNEXA: no mass, fullness, tenderness      ASSESSMENT:   .Martin was seen today for well woman.    Diagnoses and all orders for this visit:    Well woman exam with routine gynecological exam    PCOS (polycystic ovarian syndrome)        1. Health maintenance  -pap UTD -neg 2/2021  -counseled on exercise and healthy diet, weight loss  -bone health:  Discussed Vitamin D and Calcium supplementation, weight bearing exercises  2.  Discussed safety at home/school/work, healthy and balanced diet, sleep hygiene, as well as violence/weapons exposure.   3.  Discussed hormonal free contraception with paragard, pt is interested. Also discussed vasectomy

## 2023-11-01 ENCOUNTER — OFFICE VISIT (OUTPATIENT)
Dept: OBSTETRICS AND GYNECOLOGY | Facility: CLINIC | Age: 33
End: 2023-11-01
Payer: MEDICAID

## 2023-11-01 VITALS — DIASTOLIC BLOOD PRESSURE: 70 MMHG | WEIGHT: 271 LBS | SYSTOLIC BLOOD PRESSURE: 138 MMHG | BODY MASS INDEX: 45.1 KG/M2

## 2023-11-01 DIAGNOSIS — R10.2 PELVIC PAIN: Primary | ICD-10-CM

## 2023-11-01 PROCEDURE — 3078F PR MOST RECENT DIASTOLIC BLOOD PRESSURE < 80 MM HG: ICD-10-PCS | Mod: CPTII,,, | Performed by: OBSTETRICS & GYNECOLOGY

## 2023-11-01 PROCEDURE — 99999 PR PBB SHADOW E&M-EST. PATIENT-LVL II: ICD-10-PCS | Mod: PBBFAC,,, | Performed by: OBSTETRICS & GYNECOLOGY

## 2023-11-01 PROCEDURE — 1159F PR MEDICATION LIST DOCUMENTED IN MEDICAL RECORD: ICD-10-PCS | Mod: CPTII,,, | Performed by: OBSTETRICS & GYNECOLOGY

## 2023-11-01 PROCEDURE — 3078F DIAST BP <80 MM HG: CPT | Mod: CPTII,,, | Performed by: OBSTETRICS & GYNECOLOGY

## 2023-11-01 PROCEDURE — 3075F SYST BP GE 130 - 139MM HG: CPT | Mod: CPTII,,, | Performed by: OBSTETRICS & GYNECOLOGY

## 2023-11-01 PROCEDURE — 99999 PR PBB SHADOW E&M-EST. PATIENT-LVL II: CPT | Mod: PBBFAC,,, | Performed by: OBSTETRICS & GYNECOLOGY

## 2023-11-01 PROCEDURE — 3008F PR BODY MASS INDEX (BMI) DOCUMENTED: ICD-10-PCS | Mod: CPTII,,, | Performed by: OBSTETRICS & GYNECOLOGY

## 2023-11-01 PROCEDURE — 3075F PR MOST RECENT SYSTOLIC BLOOD PRESS GE 130-139MM HG: ICD-10-PCS | Mod: CPTII,,, | Performed by: OBSTETRICS & GYNECOLOGY

## 2023-11-01 PROCEDURE — 3008F BODY MASS INDEX DOCD: CPT | Mod: CPTII,,, | Performed by: OBSTETRICS & GYNECOLOGY

## 2023-11-01 PROCEDURE — 99212 PR OFFICE/OUTPT VISIT, EST, LEVL II, 10-19 MIN: ICD-10-PCS | Mod: S$PBB,,, | Performed by: OBSTETRICS & GYNECOLOGY

## 2023-11-01 PROCEDURE — 99212 OFFICE O/P EST SF 10 MIN: CPT | Mod: S$PBB,,, | Performed by: OBSTETRICS & GYNECOLOGY

## 2023-11-01 PROCEDURE — 1159F MED LIST DOCD IN RCRD: CPT | Mod: CPTII,,, | Performed by: OBSTETRICS & GYNECOLOGY

## 2023-11-01 PROCEDURE — 99212 OFFICE O/P EST SF 10 MIN: CPT | Mod: PBBFAC | Performed by: OBSTETRICS & GYNECOLOGY

## 2023-11-01 NOTE — PROGRESS NOTES
Subjective:      Patient ID: Martin Huddleston is a 32 y.o. female.    Chief Complaint:  Advice Only (Painful cramps during periods for the first 3 days)      History of Present Illness  HPI  Patient has known history of PCOS.  [place] , [place], then experience stroke like symptoms with hemiparaplegia.    Today, she reports severe right sided pain with her menses.    GYN & OB History  Patient's last menstrual period was 10/15/2023 (exact date).   Date of Last Pap: No result found    OB History    Para Term  AB Living   3 3 3     3   SAB IAB Ectopic Multiple Live Births         0 3      # Outcome Date GA Lbr Be/2nd Weight Sex Delivery Anes PTL Lv   3 Term 17 38w4d  3.71 kg (8 lb 2.9 oz) M Vag-Spont EPI N BREA   2 Term 14 40w0d / 00:08 4.072 kg (8 lb 15.6 oz) M Vag-Spont EPI N BREA   1 Term 08/10/12 40w0d   M Vag-Spont EPI N BREA      Obstetric Comments   Gynhx: reg every 21 day   PCOS - hirsutism and US   Pap neg 2021       Review of Systems  Review of Systems   Constitutional: Negative.    HENT: Negative.     Eyes: Negative.    Respiratory: Negative.     Cardiovascular: Negative.    Gastrointestinal: Negative.    Endocrine: Negative.    Genitourinary: Negative.  Positive for dysmenorrhea.   Musculoskeletal: Negative.    Integumentary:  Negative.   Neurological: Negative.    Hematological: Negative.    Psychiatric/Behavioral: Negative.     Breast: negative.           Objective:     Physical Exam:   Constitutional: She is oriented to person, place, and time. She appears well-developed.    HENT:   Head: Normocephalic and atraumatic.    Eyes: EOM are normal.     Cardiovascular:  Normal rate.             Pulmonary/Chest: Effort normal.        Abdominal: Soft. There is no abdominal tenderness.             Musculoskeletal: Normal range of motion.       Neurological: She is oriented to person, place, and time.    Skin: Skin is warm.    Psychiatric: She has a  normal mood and affect.         Assessment:     1. Pelvic pain               Plan:     1. Pelvic pain  - Pelvic ultrasound ordered.  Will call patient with results.  - Patient instructed to call office if she has not heard anything 2 wks after ultrasound.  - US Pelvis Comp with Transvag NON-OB (xpd; Future

## 2023-11-07 ENCOUNTER — HOSPITAL ENCOUNTER (OUTPATIENT)
Dept: RADIOLOGY | Facility: HOSPITAL | Age: 33
Discharge: HOME | End: 2023-11-07
Attending: OBSTETRICS & GYNECOLOGY
Payer: MEDICAID

## 2023-11-07 DIAGNOSIS — R10.2 PELVIC PAIN: ICD-10-CM

## 2023-11-07 PROCEDURE — 76830 US PELVIS COMP WITH TRANSVAG NON-OB (XPD): ICD-10-PCS | Mod: 26,,, | Performed by: RADIOLOGY

## 2023-11-07 PROCEDURE — 76830 TRANSVAGINAL US NON-OB: CPT | Mod: TC

## 2023-11-07 PROCEDURE — 76830 TRANSVAGINAL US NON-OB: CPT | Mod: 26,,, | Performed by: RADIOLOGY

## 2023-11-07 PROCEDURE — 76856 US PELVIS COMP WITH TRANSVAG NON-OB (XPD): ICD-10-PCS | Mod: 26,,, | Performed by: RADIOLOGY

## 2023-11-07 PROCEDURE — 76856 US EXAM PELVIC COMPLETE: CPT | Mod: 26,,, | Performed by: RADIOLOGY

## 2023-12-14 ENCOUNTER — OCCUPATIONAL HEALTH (OUTPATIENT)
Dept: URGENT CARE | Facility: CLINIC | Age: 33
End: 2023-12-14

## 2023-12-14 DIAGNOSIS — Z13.9 ENCOUNTER FOR SCREENING: Primary | ICD-10-CM

## 2023-12-14 PROCEDURE — 86580 POCT TB SKIN TEST: ICD-10-PCS | Mod: S$GLB,,, | Performed by: EMERGENCY MEDICINE

## 2023-12-14 PROCEDURE — 86580 TB INTRADERMAL TEST: CPT | Mod: S$GLB,,, | Performed by: EMERGENCY MEDICINE

## 2023-12-17 DIAGNOSIS — R76.11 POSITIVE TB TEST: Primary | ICD-10-CM

## 2023-12-17 NOTE — PROGRESS NOTES
Positive PPD; discussed next steps with patient and recommendation with chest x-ray; verbalized her understanding.  Denies history of positive PPD or history of TB.  Denies chills, fever, night sweats, chest pain, SOB, or recent URI.

## 2024-04-10 ENCOUNTER — HOSPITAL ENCOUNTER (EMERGENCY)
Facility: HOSPITAL | Age: 34
Discharge: HOME | End: 2024-04-10
Attending: EMERGENCY MEDICINE
Payer: MEDICAID

## 2024-04-10 VITALS
RESPIRATION RATE: 19 BRPM | TEMPERATURE: 98 F | HEART RATE: 84 BPM | HEIGHT: 65 IN | DIASTOLIC BLOOD PRESSURE: 60 MMHG | WEIGHT: 260 LBS | BODY MASS INDEX: 43.32 KG/M2 | OXYGEN SATURATION: 100 % | SYSTOLIC BLOOD PRESSURE: 126 MMHG

## 2024-04-10 DIAGNOSIS — E87.6 HYPOKALEMIA: ICD-10-CM

## 2024-04-10 DIAGNOSIS — R06.02 SHORTNESS OF BREATH: Primary | ICD-10-CM

## 2024-04-10 DIAGNOSIS — F41.0 ANXIETY ATTACK: ICD-10-CM

## 2024-04-10 LAB
ALBUMIN SERPL-MCNC: 3.4 G/DL (ref 3.3–5.5)
ALLENS TEST: ABNORMAL
ALP SERPL-CCNC: 57 U/L (ref 42–141)
B-HCG UR QL: NEGATIVE
BILIRUB SERPL-MCNC: 0.4 MG/DL (ref 0.2–1.6)
BUN SERPL-MCNC: 9 MG/DL (ref 7–22)
CALCIUM SERPL-MCNC: 9.7 MG/DL (ref 8–10.3)
CHLORIDE SERPL-SCNC: 101 MMOL/L (ref 98–108)
CREAT SERPL-MCNC: 0.5 MG/DL (ref 0.6–1.2)
CTP QC/QA: YES
GLUCOSE SERPL-MCNC: 104 MG/DL (ref 73–118)
HCO3 UR-SCNC: 27.1 MMOL/L (ref 24–28)
HCT, POC: NORMAL
HGB, POC: NORMAL (ref 14–18)
LDH SERPL L TO P-CCNC: 1.3 MMOL/L (ref 0.5–2.2)
MCH, POC: NORMAL
MCHC, POC: NORMAL
MCV, POC: NORMAL
MPV, POC: NORMAL
PCO2 BLDA: 42.9 MMHG (ref 35–45)
PH SMN: 7.41 [PH] (ref 7.35–7.45)
PO2 BLDA: 39 MMHG (ref 40–60)
POC ALT (SGPT): 13 U/L (ref 10–47)
POC AST (SGOT): 17 U/L (ref 11–38)
POC B-TYPE NATRIURETIC PEPTIDE: 10.4 PG/ML (ref 0–100)
POC BE: 2 MMOL/L
POC CARDIAC TROPONIN I: 0 NG/ML (ref 0–0.08)
POC D-DI: 227 NG/ML (ref 0–450)
POC PLATELET COUNT: NORMAL
POC SATURATED O2: 74 % (ref 95–100)
POC TCO2: 28 MMOL/L (ref 18–33)
POC TCO2: 28 MMOL/L (ref 24–29)
POTASSIUM BLD-SCNC: 3.5 MMOL/L (ref 3.6–5.1)
PROTEIN, POC: 7.7 G/DL (ref 6.4–8.1)
RBC, POC: NORMAL
RDW, POC: NORMAL
SAMPLE: ABNORMAL
SAMPLE: NORMAL
SITE: ABNORMAL
SODIUM BLD-SCNC: 143 MMOL/L (ref 128–145)
WBC, POC: NORMAL

## 2024-04-10 PROCEDURE — 94761 N-INVAS EAR/PLS OXIMETRY MLT: CPT | Mod: ER,XB

## 2024-04-10 PROCEDURE — 85025 COMPLETE CBC W/AUTO DIFF WBC: CPT | Mod: ER

## 2024-04-10 PROCEDURE — 99284 EMERGENCY DEPT VISIT MOD MDM: CPT | Mod: 25,ER

## 2024-04-10 PROCEDURE — 93010 ELECTROCARDIOGRAM REPORT: CPT | Mod: ,,, | Performed by: INTERNAL MEDICINE

## 2024-04-10 PROCEDURE — 25000003 PHARM REV CODE 250: Mod: ER | Performed by: EMERGENCY MEDICINE

## 2024-04-10 PROCEDURE — 82803 BLOOD GASES ANY COMBINATION: CPT | Mod: ER

## 2024-04-10 PROCEDURE — 83605 ASSAY OF LACTIC ACID: CPT | Mod: ER

## 2024-04-10 PROCEDURE — 80053 COMPREHEN METABOLIC PANEL: CPT | Mod: ER

## 2024-04-10 PROCEDURE — 81025 URINE PREGNANCY TEST: CPT | Mod: ER

## 2024-04-10 PROCEDURE — 84484 ASSAY OF TROPONIN QUANT: CPT | Mod: ER

## 2024-04-10 PROCEDURE — 99900035 HC TECH TIME PER 15 MIN (STAT): Mod: ER

## 2024-04-10 PROCEDURE — 83880 ASSAY OF NATRIURETIC PEPTIDE: CPT | Mod: ER

## 2024-04-10 PROCEDURE — 93005 ELECTROCARDIOGRAM TRACING: CPT | Mod: ER

## 2024-04-10 PROCEDURE — 81025 URINE PREGNANCY TEST: CPT | Mod: ER | Performed by: EMERGENCY MEDICINE

## 2024-04-10 RX ORDER — CLONAZEPAM 0.5 MG/1
0.5 TABLET ORAL 2 TIMES DAILY PRN
Qty: 6 TABLET | Refills: 0 | Status: SHIPPED | OUTPATIENT
Start: 2024-04-10 | End: 2024-04-13

## 2024-04-10 RX ORDER — POTASSIUM CHLORIDE 20 MEQ/1
40 TABLET, EXTENDED RELEASE ORAL
Status: COMPLETED | OUTPATIENT
Start: 2024-04-10 | End: 2024-04-10

## 2024-04-10 RX ADMIN — POTASSIUM CHLORIDE 40 MEQ: 1500 TABLET, EXTENDED RELEASE ORAL at 09:04

## 2024-04-11 LAB
OHS QRS DURATION: 94 MS
OHS QTC CALCULATION: 464 MS

## 2024-04-11 NOTE — ED NOTES
Pt c/o intermittent SOB x 3 days. Pt also reports head and neck pain during periods of SOB. Pt denies recent cough/illness or sick contacts. Pt also denies CP, ABD pain, weakness, dizziness, H/A and any other complaints.     Review of patient's allergies indicates:  No Known Allergies     Patient has verified the spelling of their name and  on armband.   APPEARANCE: Patient is alert, calm, oriented x 4, and does not appear distressed.  SKIN: Skin is normal for race, warm, and dry. Normal skin turgor and mucous membranes moist.  CARDIAC: Normal rate and rhythm, no murmur heard.   RESPIRATORY:Normal rate and effort. Breath sounds clear bilaterally throughout chest. Respirations are equal and unlabored.    GASTRO: Bowel sounds normal, abdomen is soft, no tenderness, and no abdominal distention.  MUSCLE: Full ROM. No bony tenderness or soft tissue tenderness. No obvious deformity.  PERIPHERAL VASCULAR: peripheral pulses present. Normal cap refill. No edema. Warm to touch.  NEURO: 5/5 strength major flexors/extensors bilaterally. Sensory intact to light touch bilaterally. [place] coma scale: eyes open spontaneously-4, oriented & converses-5, obeys commands-6. No neurological abnormalities.   MENTAL STATUS: awake, alert and aware of environment.  : Voids without complication    ED orders in progress. Pt SR up x 2. Bed in lowest position with wheels locked. Call bell within reach of pt.     Pt will use call light when she is able to urinate.

## 2024-04-11 NOTE — ED PROVIDER NOTES
Encounter Date: 4/10/2024    SCRIBE #1 NOTE: I, Pranayrafael Raymundo, am scribing for, and in the presence of,  Maria Del Rosario Durand MD.       History     Chief Complaint   Patient presents with    Shortness of Breath     REPORTS INTERMITTENT SOB X 3 DAYS WITH UPPER BACK AND GENERALIZED NECK PAIN.     33 female with polycystic ovarian syndrome, presents to the ED with complaints of acute intermittent episodes of shortness of breath that started 3 to 4 days ago. She reports episodes lasting for five minutes, with the last episode happening one hour ago while sitting at home. Patient states that she can't breathe and has intermittent bouts of fast breathing. Reports worsening symptoms with sitting and laying down. She also reports of intermittent chest pain and intermittent headache that radiates from the L neck to the L side of her head. She states that chest pain and headache is associated with the shortness of breath and is resolved at the same time. Denies a history of similar presentation. Denies any daily prescription medications. Patient does state that she takes vitamin B 12 and iron supplement for anemia. Last menstrual period was 3/26. Denies nausea/vomiting, vision changes, nervousness/anxious, urinary complaints, constipation, hematuria, hematochezia, lower extremity pain/swelling, cough, congestion, sore throat, or fever. Denies any recent long travels, prior hospitalizations, chances of being pregnant, or hormonal therapy. Patient denies smoking tobacco.    The history is provided by the patient. No  was used.     Review of patient's allergies indicates:  No Known Allergies  Past Medical History:   Diagnosis Date    Constipation     PCOS (polycystic ovarian syndrome)      Past Surgical History:   Procedure Laterality Date    intestinal surg       as a child    SINUS SURGERY  02/02/2017    VAGINAL DELIVERY       Family History   Problem Relation Age of Onset    Colon cancer Father     Diabetes  Mother     Hypertension Mother      Social History     Tobacco Use    Smoking status: Never    Smokeless tobacco: Never   Substance Use Topics    Alcohol use: No    Drug use: No     Review of Systems   Constitutional:  Negative for fever.   HENT:  Negative for congestion and sore throat.    Eyes:  Negative for visual disturbance.   Respiratory:  Positive for shortness of breath. Negative for cough.    Cardiovascular:  Positive for chest pain. Negative for leg swelling.   Gastrointestinal:  Negative for blood in stool, constipation, nausea and vomiting.   Genitourinary:  Negative for dysuria and hematuria.   Musculoskeletal:  Negative for myalgias.   Neurological:  Positive for headaches.   Psychiatric/Behavioral:  The patient is not nervous/anxious.    All other systems reviewed and are negative.      Physical Exam     Initial Vitals [04/10/24 1857]   BP Pulse Resp Temp SpO2   (!) 142/67 90 20 98.1 °F (36.7 °C) 99 %      MAP       --         Physical Exam    Nursing note and vitals reviewed.  Constitutional: She appears well-developed and well-nourished. She is not diaphoretic. No distress.   HENT:   Head: Normocephalic and atraumatic.   Mouth/Throat: Oropharynx is clear and moist. No oropharyngeal exudate.   Eyes: EOM are normal. Pupils are equal, round, and reactive to light.   Neck: Neck supple.   Normal range of motion.  Cardiovascular:  Normal rate, regular rhythm and intact distal pulses.           No murmur heard.  Pulmonary/Chest: Breath sounds normal. No accessory muscle usage or stridor. No tachypnea. No respiratory distress.   Abdominal: Abdomen is soft. Bowel sounds are normal. There is no abdominal tenderness.   Musculoskeletal:         General: No tenderness or edema. Normal range of motion.      Cervical back: Normal range of motion and neck supple.     Neurological: She is alert and oriented to person, place, and time. She has normal strength. No cranial nerve deficit.   Skin: Skin is warm and dry.  No erythema. No pallor.   Psychiatric: She has a normal mood and affect. Her mood appears not anxious.         ED Course   Procedures  Labs Reviewed   POCT CMP - Abnormal; Notable for the following components:       Result Value    POC Creatinine 0.5 (*)     POC Potassium 3.5 (*)     All other components within normal limits   ISTAT PROCEDURE - Abnormal; Notable for the following components:    POC PO2 39 (*)     All other components within normal limits   TROPONIN ISTAT   POCT URINE PREGNANCY   POCT CBC   POCT CMP   POCT TROPONIN   POCT B-TYPE NATRIURETIC PEPTIDE (BNP)   POCT D DIMER   POCT D DIMER   POCT B-TYPE NATRIURETIC PEPTIDE (BNP)     EKG Readings: (Independently Interpreted)   Initial Reading: No STEMI. Rhythm: Normal Sinus Rhythm. Heart Rate: 92. Ectopy: No Ectopy. Conduction: Normal. ST Segments: Normal ST Segments. T Waves: Normal. Axis: Normal. Clinical Impression: Normal Sinus Rhythm       Imaging Results              X-Ray Chest PA And Lateral (Final result)  Result time 04/10/24 20:30:30      Final result by Riley Serrato MD (04/10/24 20:30:30)                   Impression:      No acute process.      Electronically signed by: Riley Serrato MD  Date:    04/10/2024  Time:    20:30               Narrative:    EXAMINATION:  XR CHEST PA AND LATERAL    CLINICAL HISTORY:  Shortness of breath.    TECHNIQUE:  PA and lateral views of the chest were performed.    COMPARISON:  12/18/2023.    FINDINGS:  Monitoring EKG leads are present.  The trachea is unremarkable.  The cardiomediastinal silhouette is within normal limits.  The hilar structures are unremarkable.  There is no evidence of free air beneath the hemidiaphragms.  There are no pleural effusions.  There is no evidence of a pneumothorax.  There is no evidence of pneumomediastinum.  No airspace opacity is present.  The osseous structures are unremarkable.                                       Medications   potassium chloride SA CR tablet 40 mEq (40 mEq  Oral Given 4/10/24 2111)     Medical Decision Making  Amount and/or Complexity of Data Reviewed  Labs: ordered. Decision-making details documented in ED Course.  Radiology: ordered. Decision-making details documented in ED Course.  ECG/medicine tests: ordered and independent interpretation performed. Decision-making details documented in ED Course.    Risk  Prescription drug management.    Labs Reviewed        Admission on 04/10/2024, Discharged on 04/10/2024   Component Date Value Ref Range Status    POC Preg Test, Ur 04/10/2024 Negative  Negative Final     Acceptable 04/10/2024 Yes   Final    POC Cardiac Troponin I 04/10/2024 0.00  0.00 - 0.08 ng/mL Final    Sample 04/10/2024 unknown   Final    Comment: A single negative troponin is insufficient to rule out myocardial infarction.  The use of a serial sampling protocol is recommended practice. Correlate results with reference intervals established for methodology used. Point of care and core laboratory   troponin results are not interchangeable.      Albumin, POC 04/10/2024 3.4  3.3 - 5.5 g/dL Final    Alkaline Phosphatase, POC 04/10/2024 57  42 - 141 U/L Final    ALT (SGPT), POC 04/10/2024 13  10 - 47 U/L Final    AST (SGOT), POC 04/10/2024 17  11 - 38 U/L Final    POC BUN 04/10/2024 9  7 - 22 mg/dL Final    Calcium, POC 04/10/2024 9.7  8.0 - 10.3 mg/dL Final    POC Chloride 04/10/2024 101  98 - 108 mmol/L Final    POC Creatinine 04/10/2024 0.5 (L)  0.6 - 1.2 mg/dL Final    POC Glucose 04/10/2024 104  73 - 118 mg/dL Final    POC Potassium 04/10/2024 3.5 (L)  3.6 - 5.1 mmol/L Final    POC Sodium 04/10/2024 143  128 - 145 mmol/L Final    Bilirubin, POC 04/10/2024 0.4  0.2 - 1.6 mg/dL Final    POC TCO2 04/10/2024 28  18 - 33 mmol/L Final    Protein, POC 04/10/2024 7.7  6.4 - 8.1 g/dL Final    POC PH 04/10/2024 7.407  7.35 - 7.45 Final    POC PCO2 04/10/2024 42.9  35 - 45 mmHg Final    POC PO2 04/10/2024 39 (L)  40 - 60 mmHg Final    POC HCO3 04/10/2024  27.1  24 - 28 mmol/L Final    POC BE 04/10/2024 2  -2 to 2 mmol/L Final    POC SATURATED O2 04/10/2024 74  95 - 100 % Final    POC Lactate 04/10/2024 1.30  0.5 - 2.2 mmol/L Final    POC TCO2 04/10/2024 28  24 - 29 mmol/L Final    Sample 04/10/2024 VENOUS   Final    Site 04/10/2024 Other   Final    Allens Test 04/10/2024 N/A   Final    POC D-DI 04/10/2024 227  0.0 - 450.0 ng/mL Final    POC B-Type Natriuretic Peptide 04/10/2024 10.4  0.0 - 100.0 pg/mL Final        Imaging Reviewed    Imaging Results              X-Ray Chest PA And Lateral (Final result)  Result time 04/10/24 20:30:30      Final result by Riley Serrato MD (04/10/24 20:30:30)                   Impression:      No acute process.      Electronically signed by: Riley Serrato MD  Date:    04/10/2024  Time:    20:30               Narrative:    EXAMINATION:  XR CHEST PA AND LATERAL    CLINICAL HISTORY:  Shortness of breath.    TECHNIQUE:  PA and lateral views of the chest were performed.    COMPARISON:  12/18/2023.    FINDINGS:  Monitoring EKG leads are present.  The trachea is unremarkable.  The cardiomediastinal silhouette is within normal limits.  The hilar structures are unremarkable.  There is no evidence of free air beneath the hemidiaphragms.  There are no pleural effusions.  There is no evidence of a pneumothorax.  There is no evidence of pneumomediastinum.  No airspace opacity is present.  The osseous structures are unremarkable.                                      Medications given in ED    Medications   potassium chloride SA CR tablet 40 mEq (40 mEq Oral Given 4/10/24 2111)         Note was created using voice recognition software. Note may have occasional typographical errors that may not have been identified and edited despite good oskar initial review prior to signing.    I, Maria Del Rosario Durand MD, personally performed the services described in this documentation. All medical record entries made by the scribe were at my direction and in my presence.   I have reviewed the chart and agree that the record reflects my personal performance and is accurate and complete.            Scribe Attestation:   Scribe #1: I performed the above scribed service and the documentation accurately describes the services I performed. I attest to the accuracy of the note.                   Medical Decision Making:   Differential Diagnosis:   Pneumonia, pneumothorax, COPD exacerbation, symptomatic anemia, dysrhythmia, PE, pleural effusion, anxiety, and others.    Clinical Tests:   Lab Tests: Ordered and Reviewed  Radiological Study: Ordered and Reviewed  Medical Tests: Ordered and Reviewed  ED Management:  This 33 y.o. patient presents with dyspnea, most likely secondary to an anxiety attack. Mild hypokalemia (K 3.5) otherwise labs and imaging unremarkable. No recurrent symptoms throughout ED course. Presentation not consistent with acute cardiac etiologies to include ACS (non ischemic ekg, unremarkable trop), CHF, pericardial effusion / tamponade . Presentation not consistent with acute respiratory etiologies to include acute PE (Wells low risk, d-dimer negative), pneumothorax , asthma, COPD exacerbation, allergic etiologies, or infectious etiologies such as PNA. Presentation also not consistent with non-cardiopulmonary causes to include toxidromes, metabolic etiologies such as acidemia or significant electrolyte derangements, sepsis, neurologic causes (i.e. demyelinating diseases).Test results, imaging results, outpatient management plan, outpatient PCP follow up and ED return precautions discussed with patient with understanding and agreement.              Clinical Impression:  Final diagnoses:  [R06.02] Shortness of breath (Primary)  [E87.6] Hypokalemia  [F41.0] Anxiety attack          ED Disposition Condition    Discharge Stable          ED Prescriptions       Medication Sig Dispense Start Date End Date Auth. Provider    clonazePAM (KLONOPIN) 0.5 MG tablet Take 1 tablet (0.5 mg  total) by mouth 2 (two) times daily as needed for Anxiety (Do not drive while taking). 6 tablet 4/10/2024 4/13/2024 Maria Del Rosario Durand MD          Follow-up Information       Follow up With Specialties Details Why Contact Info    The nearest emergency department.  Go to  As needed, If symptoms worsen     Your PCP  Call in 1 day to schedule an appointment, for re-evaluation of today's complaint, and ongoing care              Maria Del Rosario Durand MD  04/15/24 [id number]

## 2024-08-09 ENCOUNTER — HOSPITAL ENCOUNTER (EMERGENCY)
Facility: HOSPITAL | Age: 34
Discharge: HOME | End: 2024-08-09
Attending: EMERGENCY MEDICINE
Payer: MEDICAID

## 2024-08-09 VITALS
OXYGEN SATURATION: 98 % | WEIGHT: 293 LBS | TEMPERATURE: 99 F | DIASTOLIC BLOOD PRESSURE: 83 MMHG | HEART RATE: 117 BPM | HEIGHT: 61 IN | RESPIRATION RATE: 18 BRPM | BODY MASS INDEX: 55.32 KG/M2 | SYSTOLIC BLOOD PRESSURE: 139 MMHG

## 2024-08-09 DIAGNOSIS — N30.01 ACUTE CYSTITIS WITH HEMATURIA: Primary | ICD-10-CM

## 2024-08-09 DIAGNOSIS — R30.0 DYSURIA: ICD-10-CM

## 2024-08-09 LAB
B-HCG UR QL: NEGATIVE
BILIRUBIN, POC UA: NEGATIVE
BLOOD, POC UA: ABNORMAL
CLARITY, UA POC: ABNORMAL
COLOR, UA POC: ABNORMAL
CTP QC/QA: YES
CTP QC/QA: YES
GLUCOSE, POC UA: NEGATIVE
KETONES, POC UA: NEGATIVE
LEUKOCYTE EST, POC UA: ABNORMAL
NITRITE, POC UA: NEGATIVE
PH UR STRIP: 7.5 [PH]
PROTEIN, POC UA: ABNORMAL
SARS-COV-2 RDRP RESP QL NAA+PROBE: NEGATIVE
SPECIFIC GRAVITY, POC UA: 1.02
UROBILINOGEN, POC UA: 0.2 E.U./DL

## 2024-08-09 PROCEDURE — 81025 URINE PREGNANCY TEST: CPT | Mod: ER

## 2024-08-09 PROCEDURE — 87186 SC STD MICRODIL/AGAR DIL: CPT | Performed by: EMERGENCY MEDICINE

## 2024-08-09 PROCEDURE — 87086 URINE CULTURE/COLONY COUNT: CPT | Performed by: EMERGENCY MEDICINE

## 2024-08-09 PROCEDURE — 87088 URINE BACTERIA CULTURE: CPT | Performed by: EMERGENCY MEDICINE

## 2024-08-09 PROCEDURE — 87635 SARS-COV-2 COVID-19 AMP PRB: CPT | Mod: ER

## 2024-08-09 PROCEDURE — 99284 EMERGENCY DEPT VISIT MOD MDM: CPT | Mod: ER

## 2024-08-09 PROCEDURE — 25000003 PHARM REV CODE 250: Mod: ER | Performed by: EMERGENCY MEDICINE

## 2024-08-09 RX ORDER — IBUPROFEN 600 MG/1
600 TABLET ORAL
Status: COMPLETED | OUTPATIENT
Start: 2024-08-09 | End: 2024-08-09

## 2024-08-09 RX ORDER — KETOROLAC TROMETHAMINE 10 MG/1
10 TABLET, FILM COATED ORAL EVERY 6 HOURS PRN
Qty: 12 TABLET | Refills: 0 | Status: SHIPPED | OUTPATIENT
Start: 2024-08-09 | End: 2024-08-12

## 2024-08-09 RX ORDER — PHENAZOPYRIDINE HYDROCHLORIDE 200 MG/1
200 TABLET, FILM COATED ORAL
Qty: 6 TABLET | Refills: 0 | Status: SHIPPED | OUTPATIENT
Start: 2024-08-09 | End: 2024-08-11

## 2024-08-09 RX ORDER — CIPROFLOXACIN 500 MG/1
500 TABLET ORAL 2 TIMES DAILY
Qty: 20 TABLET | Refills: 0 | Status: SHIPPED | OUTPATIENT
Start: 2024-08-09 | End: 2024-08-23

## 2024-08-09 RX ADMIN — IBUPROFEN 600 MG: 600 TABLET ORAL at 08:08

## 2024-08-10 NOTE — ED PROVIDER NOTES
Encounter Date: 8/9/2024       History     Chief Complaint   Patient presents with    Fever     FEVER INTERMITTENT X 5 DAYS,POSSIBLE UTI     33 y.o. female with PCOS and others presents emergency department complaining of subjective fever, dysuria and urinary frequency, intermittent headache that began five days ago.  She reports taking Aleve and Tylenol with temporary relief.  She denies nasal congestion, rhinorrhea, cough, sore throat, nausea, vomiting, diarrhea, vision change, photophobia, rash, neck stiffness, abdominal pain, vaginal discharge, hematuria, flank pain or other acute complaints.  Denies known sick contacts.    The history is provided by the patient.     Review of patient's allergies indicates:  No Known Allergies  Past Medical History:   Diagnosis Date    Constipation     PCOS (polycystic ovarian syndrome)      Past Surgical History:   Procedure Laterality Date    intestinal surg       as a child    SINUS SURGERY  02/02/2017    VAGINAL DELIVERY       Family History   Problem Relation Name Age of Onset    Colon cancer Father      Diabetes Mother      Hypertension Mother       Social History     Tobacco Use    Smoking status: Never    Smokeless tobacco: Never   Substance Use Topics    Alcohol use: No    Drug use: No     Review of Systems   Constitutional:  Positive for chills and fever (Subjective).   HENT:  Negative for congestion, rhinorrhea and sore throat.    Eyes:  Negative for photophobia and visual disturbance.   Respiratory:  Negative for cough and shortness of breath.    Cardiovascular:  Negative for chest pain.   Gastrointestinal:  Negative for abdominal pain, diarrhea and vomiting.   Genitourinary:  Positive for dysuria and frequency. Negative for difficulty urinating, flank pain, hematuria, pelvic pain and vaginal discharge.   Musculoskeletal:  Negative for back pain, gait problem, myalgias and neck stiffness.   Neurological:  Positive for headaches. Negative for syncope.       Physical  Exam     Initial Vitals [08/09/24 2021]   BP Pulse Resp Temp SpO2   139/83 (!) 117 18 (!) 101.5 °F (38.6 °C) 98 %      MAP       --         Physical Exam    Nursing note and vitals reviewed.  Constitutional: He appears well-developed and well-nourished. He is not diaphoretic. No distress.   HENT:   Head: Normocephalic and atraumatic.   Eyes: Conjunctivae are normal.   Neck: Phonation normal. Neck supple. No stridor present.   Normal range of motion.  Cardiovascular:  Normal rate and intact distal pulses.           Pulmonary/Chest: Effort normal. No accessory muscle usage or stridor. No tachypnea. No respiratory distress.   Abdominal: Abdomen is soft. He exhibits no distension. There is no abdominal tenderness. There is no rebound and no guarding.   Musculoskeletal:         General: No tenderness. Normal range of motion.      Cervical back: Normal range of motion and neck supple.     Neurological: He is alert and oriented to person, place, and time. He has normal strength. Gait normal. GCS score is 15. GCS eye subscore is 4. GCS verbal subscore is 5. GCS motor subscore is 6.   Skin: Skin is warm. Capillary refill takes less than 2 seconds.   Psychiatric: He has a normal mood and affect.         ED Course   Procedures  Labs Reviewed   CULTURE, URINE - Abnormal       Result Value    Urine Culture, Routine   (*)     Value: ESCHERICHIA COLI  >100,000 cfu/ml      Narrative:     Indicated criteria for high risk culture:->Other  Other (specify):->uti   POCT URINALYSIS W/O SCOPE - Abnormal    Glucose, UA Negative      Bilirubin, UA Negative      Ketones, UA Negative      Spec Grav UA 1.025      Blood, UA 3+ (*)     PH, UA 7.5      Protein, UA 2+ (*)     Urobilinogen, UA 0.2      Nitrite, UA Negative      Leukocytes, UA 1+ (*)     Color, UA POC Celine      Clarity, UA, POC Turbid     SARS-COV-2 RDRP GENE    POC Rapid COVID Negative       Acceptable Yes      Narrative:     This test utilizes isothermal nucleic  acid amplification technology to detect the SARS-CoV-2 RdRp nucleic acid segment. The analytical sensitivity (limit of detection) is 500 copies/swab.     A POSITIVE result is indicative of the presence of SARS-CoV-2 RNA; clinical correlation with patient history and other diagnostic information is necessary to determine patient infection status.    A NEGATIVE result means that SARS-CoV-2 nucleic acids are not present above the limit of detection. A NEGATIVE result should be treated as presumptive. It does not rule out the possibility of COVID-19 and should not be the sole basis for treatment decisions. If COVID-19 is strongly suspected based on clinical and exposure history, re-testing using an alternate molecular assay should be considered.     Commercial kits are provided by [employer].   _________________________________________________________________   The authorized Fact Sheet for Healthcare Providers and the authorized Fact Sheet for Patients of the ID NOW COVID-19 are available on the FDA website:    https://www.fda.gov/media/069583/download      https://www.fda.gov/media/350169/download       POCT URINE PREGNANCY    POC Preg Test, Ur Negative       Acceptable Yes     POCT URINALYSIS W/O SCOPE          Imaging Results    None          Medications   ibuprofen tablet 600 mg (600 mg Oral Given 8/9/24 2027)     Medical Decision Making  Amount and/or Complexity of Data Reviewed  Labs: ordered.    Risk  Prescription drug management.                        Labs Reviewed  Pertinent lab findings include:   UA with 3+ hematuria, 2+ proteinuria, 1+ LE  Admission on 08/09/2024, Discharged on 08/09/2024   Component Date Value Ref Range Status    POC Rapid COVID 08/09/2024 Negative  Negative Final     Acceptable 08/09/2024 Yes   Final    POC Preg Test, Ur 08/09/2024 Negative  Negative Final     Acceptable 08/09/2024 Yes   Final    Glucose, UA 08/09/2024 Negative   Final     Bilirubin, UA 2024 Negative   Final    Ketones, UA 2024 Negative   Final    Spec Grav UA 2024 1.025   Final    Blood, UA 2024 3+ (A)   Final    PH, UA 2024 7.5   Final    Protein, UA 2024 2+ (A)   Final    Urobilinogen, UA 2024 0.2  E.U./dL Final    Nitrite, UA 2024 Negative   Final    Leukocytes, UA 2024 1+ (A)   Final    Color, UA POC 2024 Celine   Final    Clarity, UA, POC 2024 Turbid   Final    Urine Culture, Routine 2024  (A)   Final                    Value:ESCHERICHIA COLI  >100,000 cfu/ml          Imaging Reviewed    Imaging Results    None         Medications given in ED    Medications   ibuprofen tablet 600 mg (600 mg Oral Given 24)       Note was created using voice recognition software. Note may have occasional typographical errors that may not have been identified and edited despite good oskar initial review prior to signing.    Medical Decision Making:   Differential Diagnosis:   Acute cystitis, vaginitis (vulvovaginal candidiasis, trichomonas, bacterial vaginosis), cervicitis (gonorrhea, chlamydia, trichomonas, candidiasis, HSV, chemical irritation), interstitial cystitis, PID, others    Clinical Tests:   Lab Tests: Ordered and Reviewed  ED Management:  Lab results, outpatient management plan, outpatient PCP follow up and ED return precautions discussed with patient with understanding and agreement.                Clinical Impression:  Final diagnoses:  [N30.01] Acute cystitis with hematuria (Primary)  [R30.0] Dysuria          ED Disposition Condition    Discharge Stable          ED Prescriptions       Medication Sig Dispense Start Date End Date Auth. Provider    phenazopyridine (PYRIDIUM) 200 MG tablet () Take 1 tablet (200 mg total) by mouth 3 (three) times daily with meals. for 2 days 6 tablet 2024 Maria Del Rosario Durand MD    ciprofloxacin HCl (CIPRO) 500 MG tablet Take 1 tablet (500 mg total) by mouth  2 (two) times daily. for 14 days 20 tablet 2024 Maria Del Rosario Durand MD    ketorolac (TORADOL) 10 mg tablet () Take 1 tablet (10 mg total) by mouth every 6 (six) hours as needed for Pain (take with food). 12 tablet 2024 Maria Del Rosario Durand MD          Follow-up Information       Follow up With Specialties Details Why Contact Info    The nearest emergency department.  Go to  As needed, If symptoms worsen     Your PCP  Call  Monday morning, to schedule an appointment, for re-evaluation of today's complaint, and ongoing care              Maria Del Rosario Durand MD  08/15/24 [id number]

## 2024-08-12 LAB — BACTERIA UR CULT: ABNORMAL

## 2025-03-08 ENCOUNTER — HOSPITAL ENCOUNTER (EMERGENCY)
Facility: HOSPITAL | Age: 35
Discharge: HOME | End: 2025-03-08
Attending: STUDENT IN AN ORGANIZED HEALTH CARE EDUCATION/TRAINING PROGRAM
Payer: MEDICAID

## 2025-03-08 VITALS
WEIGHT: 270 LBS | HEART RATE: 74 BPM | DIASTOLIC BLOOD PRESSURE: 70 MMHG | RESPIRATION RATE: 19 BRPM | OXYGEN SATURATION: 99 % | TEMPERATURE: 98 F | BODY MASS INDEX: 44.98 KG/M2 | HEIGHT: 65 IN | SYSTOLIC BLOOD PRESSURE: 127 MMHG

## 2025-03-08 DIAGNOSIS — R10.13 EPIGASTRIC PAIN: Primary | ICD-10-CM

## 2025-03-08 DIAGNOSIS — K76.0 HEPATIC STEATOSIS: ICD-10-CM

## 2025-03-08 LAB
ALBUMIN SERPL BCP-MCNC: 3.8 G/DL (ref 3.5–5.2)
ALP SERPL-CCNC: 61 U/L (ref 40–150)
ALT SERPL W/O P-5'-P-CCNC: 28 U/L (ref 10–44)
ANION GAP SERPL CALC-SCNC: 10 MMOL/L (ref 8–16)
AST SERPL-CCNC: 41 U/L (ref 10–40)
B-HCG UR QL: NEGATIVE
BASOPHILS # BLD AUTO: 0.02 K/UL (ref 0–0.2)
BASOPHILS NFR BLD: 0.3 % (ref 0–1.9)
BILIRUB SERPL-MCNC: 0.3 MG/DL (ref 0.1–1)
BILIRUB UR QL STRIP: NEGATIVE
BUN SERPL-MCNC: 8 MG/DL (ref 6–20)
CALCIUM SERPL-MCNC: 8.9 MG/DL (ref 8.7–10.5)
CHLORIDE SERPL-SCNC: 107 MMOL/L (ref 95–110)
CLARITY UR: CLEAR
CO2 SERPL-SCNC: 25 MMOL/L (ref 23–29)
COLOR UR: YELLOW
CREAT SERPL-MCNC: 0.6 MG/DL (ref 0.5–1.4)
CTP QC/QA: YES
DIFFERENTIAL METHOD BLD: ABNORMAL
EOSINOPHIL # BLD AUTO: 0.1 K/UL (ref 0–0.5)
EOSINOPHIL NFR BLD: 1.4 % (ref 0–8)
ERYTHROCYTE [DISTWIDTH] IN BLOOD BY AUTOMATED COUNT: 13.6 % (ref 11.5–14.5)
EST. GFR  (NO RACE VARIABLE): >60 ML/MIN/1.73 M^2
GLUCOSE SERPL-MCNC: 111 MG/DL (ref 70–110)
GLUCOSE UR QL STRIP: NEGATIVE
HCT VFR BLD AUTO: 38.7 % (ref 37–48.5)
HGB BLD-MCNC: 12.3 G/DL (ref 12–16)
HGB UR QL STRIP: NEGATIVE
IMM GRANULOCYTES # BLD AUTO: 0.01 K/UL (ref 0–0.04)
IMM GRANULOCYTES NFR BLD AUTO: 0.1 % (ref 0–0.5)
KETONES UR QL STRIP: NEGATIVE
LEUKOCYTE ESTERASE UR QL STRIP: NEGATIVE
LIPASE SERPL-CCNC: 31 U/L (ref 4–60)
LYMPHOCYTES # BLD AUTO: 2.3 K/UL (ref 1–4.8)
LYMPHOCYTES NFR BLD: 31.9 % (ref 18–48)
MCH RBC QN AUTO: 26.9 PG (ref 27–31)
MCHC RBC AUTO-ENTMCNC: 31.8 G/DL (ref 32–36)
MCV RBC AUTO: 85 FL (ref 82–98)
MONOCYTES # BLD AUTO: 0.5 K/UL (ref 0.3–1)
MONOCYTES NFR BLD: 6.3 % (ref 4–15)
NEUTROPHILS # BLD AUTO: 4.3 K/UL (ref 1.8–7.7)
NEUTROPHILS NFR BLD: 60 % (ref 38–73)
NITRITE UR QL STRIP: NEGATIVE
NRBC BLD-RTO: 0 /100 WBC
PH UR STRIP: 6 [PH] (ref 5–8)
PLATELET # BLD AUTO: 255 K/UL (ref 150–450)
PMV BLD AUTO: 9.5 FL (ref 9.2–12.9)
POTASSIUM SERPL-SCNC: 3.7 MMOL/L (ref 3.5–5.1)
PROT SERPL-MCNC: 7.5 G/DL (ref 6–8.4)
PROT UR QL STRIP: NEGATIVE
RBC # BLD AUTO: 4.58 M/UL (ref 4–5.4)
SODIUM SERPL-SCNC: 142 MMOL/L (ref 136–145)
SP GR UR STRIP: 1.02 (ref 1–1.03)
URN SPEC COLLECT METH UR: NORMAL
UROBILINOGEN UR STRIP-ACNC: NEGATIVE EU/DL
WBC # BLD AUTO: 7.17 K/UL (ref 3.9–12.7)

## 2025-03-08 PROCEDURE — 81025 URINE PREGNANCY TEST: CPT | Performed by: STUDENT IN AN ORGANIZED HEALTH CARE EDUCATION/TRAINING PROGRAM

## 2025-03-08 PROCEDURE — 83690 ASSAY OF LIPASE: CPT | Performed by: STUDENT IN AN ORGANIZED HEALTH CARE EDUCATION/TRAINING PROGRAM

## 2025-03-08 PROCEDURE — 81003 URINALYSIS AUTO W/O SCOPE: CPT | Performed by: STUDENT IN AN ORGANIZED HEALTH CARE EDUCATION/TRAINING PROGRAM

## 2025-03-08 PROCEDURE — 63600175 PHARM REV CODE 636 W HCPCS: Mod: JZ,TB | Performed by: PHYSICIAN ASSISTANT

## 2025-03-08 PROCEDURE — 25000003 PHARM REV CODE 250: Performed by: STUDENT IN AN ORGANIZED HEALTH CARE EDUCATION/TRAINING PROGRAM

## 2025-03-08 PROCEDURE — 96375 TX/PRO/DX INJ NEW DRUG ADDON: CPT

## 2025-03-08 PROCEDURE — 80053 COMPREHEN METABOLIC PANEL: CPT | Performed by: STUDENT IN AN ORGANIZED HEALTH CARE EDUCATION/TRAINING PROGRAM

## 2025-03-08 PROCEDURE — 99285 EMERGENCY DEPT VISIT HI MDM: CPT | Mod: 25

## 2025-03-08 PROCEDURE — 96374 THER/PROPH/DIAG INJ IV PUSH: CPT

## 2025-03-08 PROCEDURE — 85025 COMPLETE CBC W/AUTO DIFF WBC: CPT | Performed by: STUDENT IN AN ORGANIZED HEALTH CARE EDUCATION/TRAINING PROGRAM

## 2025-03-08 PROCEDURE — 25000003 PHARM REV CODE 250: Performed by: PHYSICIAN ASSISTANT

## 2025-03-08 PROCEDURE — 63600175 PHARM REV CODE 636 W HCPCS: Performed by: STUDENT IN AN ORGANIZED HEALTH CARE EDUCATION/TRAINING PROGRAM

## 2025-03-08 RX ORDER — PANTOPRAZOLE SODIUM 40 MG/1
40 TABLET, DELAYED RELEASE ORAL DAILY
Qty: 30 TABLET | Refills: 0 | Status: SHIPPED | OUTPATIENT
Start: 2025-03-08 | End: 2025-04-07

## 2025-03-08 RX ORDER — LIDOCAINE HYDROCHLORIDE 20 MG/ML
15 SOLUTION OROPHARYNGEAL ONCE
Status: COMPLETED | OUTPATIENT
Start: 2025-03-08 | End: 2025-03-08

## 2025-03-08 RX ORDER — ONDANSETRON 4 MG/1
4 TABLET, ORALLY DISINTEGRATING ORAL EVERY 6 HOURS PRN
Qty: 15 TABLET | Refills: 0 | Status: SHIPPED | OUTPATIENT
Start: 2025-03-08 | End: 2025-03-13

## 2025-03-08 RX ORDER — ACETAMINOPHEN 500 MG
500 TABLET ORAL
Status: COMPLETED | OUTPATIENT
Start: 2025-03-08 | End: 2025-03-08

## 2025-03-08 RX ORDER — ONDANSETRON HYDROCHLORIDE 2 MG/ML
4 INJECTION, SOLUTION INTRAVENOUS
Status: COMPLETED | OUTPATIENT
Start: 2025-03-08 | End: 2025-03-08

## 2025-03-08 RX ORDER — KETOROLAC TROMETHAMINE 30 MG/ML
15 INJECTION, SOLUTION INTRAMUSCULAR; INTRAVENOUS
Status: COMPLETED | OUTPATIENT
Start: 2025-03-08 | End: 2025-03-08

## 2025-03-08 RX ORDER — ALUMINUM HYDROXIDE, MAGNESIUM HYDROXIDE, AND SIMETHICONE 1200; 120; 1200 MG/30ML; MG/30ML; MG/30ML
30 SUSPENSION ORAL ONCE
Status: COMPLETED | OUTPATIENT
Start: 2025-03-08 | End: 2025-03-08

## 2025-03-08 RX ORDER — ALUMINUM HYDROXIDE, MAGNESIUM HYDROXIDE, AND SIMETHICONE 1200; 120; 1200 MG/30ML; MG/30ML; MG/30ML
30 SUSPENSION ORAL
Qty: 354 ML | Refills: 0 | Status: SHIPPED | OUTPATIENT
Start: 2025-03-08 | End: 2025-03-15

## 2025-03-08 RX ORDER — FAMOTIDINE 10 MG/ML
20 INJECTION, SOLUTION INTRAVENOUS
Status: COMPLETED | OUTPATIENT
Start: 2025-03-08 | End: 2025-03-08

## 2025-03-08 RX ORDER — PROMETHAZINE HYDROCHLORIDE 25 MG/1
25 TABLET ORAL EVERY 6 HOURS PRN
Qty: 15 TABLET | Refills: 0 | Status: SHIPPED | OUTPATIENT
Start: 2025-03-08

## 2025-03-08 RX ADMIN — KETOROLAC TROMETHAMINE 15 MG: 30 INJECTION, SOLUTION INTRAMUSCULAR; INTRAVENOUS at 08:03

## 2025-03-08 RX ADMIN — LIDOCAINE HYDROCHLORIDE 15 ML: 20 SOLUTION ORAL at 05:03

## 2025-03-08 RX ADMIN — FAMOTIDINE 20 MG: 10 INJECTION, SOLUTION INTRAVENOUS at 08:03

## 2025-03-08 RX ADMIN — ACETAMINOPHEN 500 MG: 500 TABLET ORAL at 08:03

## 2025-03-08 RX ADMIN — ONDANSETRON 4 MG: 2 INJECTION INTRAMUSCULAR; INTRAVENOUS at 05:03

## 2025-03-08 RX ADMIN — ALUMINUM HYDROXIDE, MAGNESIUM HYDROXIDE, AND DIMETHICONE 30 ML: 200; 20; 200 SUSPENSION ORAL at 05:03

## 2025-03-08 NOTE — DISCHARGE INSTRUCTIONS

## 2025-03-08 NOTE — Clinical Note
"Martin"Ashish Huddleston was seen and treated in our emergency department on 3/8/2025.  She may return to work on 03/11/2025.       If you have any questions or concerns, please don't hesitate to call.      Jill Gar PA-C"

## 2025-03-08 NOTE — ED TRIAGE NOTES
Pt arrived to ED with c/o epigastric pain radiating to her back since 30 min pta. Pt also states of nausea and vomiting. States taking motrin but with no relief. Denies any chest pain, SOB. Pt is in severe pain on arrival to ED.

## 2025-03-08 NOTE — ED PROVIDER NOTES
Encounter Date: 3/8/2025       History     Chief Complaint   Patient presents with    Abdominal Pain     Pt with epigastric pain starting 30 minutes ago, pt took motrin PTA. Pain is 10/10     34 y.o. adult with history below presents for evaluation of abdominal pain.  Patient reports midepigastric abdominal pain beginning whenever prior to presentation, waking her from sleep.  Associated nausea without vomiting.  No medication taken prior to arrival.  Never felt like this before.  Reports pain radiates through to the back.  Took Motrin without improvement.  The patient otherwise denies Chest Pain, Shortness of Breath, Eye complaints or Visual changes, Ear complaints, Neck or Back Pain, and Myalgias    Triage vitals were reviewed and are: Initial Vitals [03/08/25 0517]  BP: 139/76  Pulse: 89  Resp: 18  Temp: 97.6 °F (36.4 °C)  SpO2: 99 %  MAP: n/a    The Patient:   has a past medical history of Constipation and PCOS (polycystic ovarian syndrome).   has a past surgical history that includes intestinal surg ; Sinus surgery (02/02/2017); and Vaginal delivery.   reports that he has never smoked. He has never used smokeless tobacco. He reports that he does not drink alcohol and does not use drugs.  Has allergies listed as: Patient has no known allergies.        The history is provided by the patient. No  was used.     Review of patient's allergies indicates:  No Known Allergies  Past Medical History:   Diagnosis Date    Constipation     PCOS (polycystic ovarian syndrome)      Past Surgical History:   Procedure Laterality Date    intestinal surg       as a child    SINUS SURGERY  02/02/2017    VAGINAL DELIVERY       Family History   Problem Relation Name Age of Onset    Colon cancer Father      Diabetes Mother      Hypertension Mother       Social History[1]  Review of Systems   All other systems reviewed and are negative.      Physical Exam     Initial Vitals [03/08/25 0517]   BP Pulse Resp Temp SpO2    139/76 89 18 97.6 °F (36.4 °C) 99 %      MAP       --         Physical Exam    Nursing note and vitals reviewed.  Constitutional: She appears well-developed and well-nourished.   Body mass index is 44.93 kg/m².   HENT:   Head: Normocephalic and atraumatic.   Eyes: EOM are normal. Pupils are equal, round, and reactive to light.   Neck: Neck supple.   Cardiovascular:  Normal rate, regular rhythm, normal heart sounds and intact distal pulses.           Pulmonary/Chest: Breath sounds normal. No respiratory distress.   Abdominal:   Patient placed supine with flexed knees.  Abdomen is soft, nondistended, moderately tender in the RUQ, mid-epigastrium and without guarding. There are no overlying skin changes. There are normal bowel sounds. Distal Pulses 2+. no CVA Tenderness.    Focused Testing //  (-) Alvarenga's sign   (-) Rebound Tenderness   (-) Heel Tap / Bed rock      Musculoskeletal:      Cervical back: Neck supple.     Neurological: She is alert and oriented to person, place, and time. GCS score is 15. GCS eye subscore is 4. GCS verbal subscore is 5. GCS motor subscore is 6.   Skin: Skin is warm and dry. Capillary refill takes less than 2 seconds.   Psychiatric: She has a normal mood and affect. Her behavior is normal.         ED Course   Procedures  Labs Reviewed   CBC W/ AUTO DIFFERENTIAL - Abnormal       Result Value    WBC 7.17      RBC 4.58      Hemoglobin 12.3      Hematocrit 38.7      MCV 85      MCH 26.9 (*)     MCHC 31.8 (*)     RDW 13.6      Platelets 255      MPV 9.5      Immature Granulocytes 0.1      Gran # (ANC) 4.3      Immature Grans (Abs) 0.01      Lymph # 2.3      Mono # 0.5      Eos # 0.1      Baso # 0.02      nRBC 0      Gran % 60.0      Lymph % 31.9      Mono % 6.3      Eosinophil % 1.4      Basophil % 0.3      Differential Method Automated     COMPREHENSIVE METABOLIC PANEL - Abnormal    Sodium 142      Potassium 3.7      Chloride 107      CO2 25      Glucose 111 (*)     BUN 8      Creatinine 0.6       Calcium 8.9      Total Protein 7.5      Albumin 3.8      Total Bilirubin 0.3      Alkaline Phosphatase 61      AST 41 (*)     ALT 28      eGFR >60      Anion Gap 10     LIPASE    Lipase 31     URINALYSIS, REFLEX TO URINE CULTURE    Specimen UA Urine, Clean Catch      Color, UA Yellow      Appearance, UA Clear      pH, UA 6.0      Specific Gravity, UA 1.020      Protein, UA Negative      Glucose, UA Negative      Ketones, UA Negative      Bilirubin (UA) Negative      Occult Blood UA Negative      Nitrite, UA Negative      Urobilinogen, UA Negative      Leukocytes, UA Negative      Narrative:     Specimen Source->Urine   POCT URINE PREGNANCY    POC Preg Test, Ur Negative       Acceptable Yes            Imaging Results              US Abdomen Limited (Final result)  Result time 03/08/25 07:27:52      Final result by Duarte Velarde MD (03/08/25 07:27:52)                   Impression:      No sonographic evidence of cholelithiasis or biliary obstruction.    Mild hepatomegaly with subtle diffuse increased hepatic parenchymal echogenicity suggesting the possibility of steatosis.      Electronically signed by: Duarte Velarde MD  Date:    03/08/2025  Time:    07:27               Narrative:    EXAMINATION:  US ABDOMEN LIMITED    CLINICAL HISTORY:  abdominal pain;    TECHNIQUE:  Limited ultrasound of the right upper quadrant of the abdomen  was performed.    COMPARISON:  None.    FINDINGS:  Liver: Enlarged, measuring 21.7 cm in craniocaudal extent.  Mild diffuse increased echogenicity .   focal hepatic lesions.    Gallbladder: No calculi, wall thickening, or pericholecystic fluid.  No sonographic Alvarenga's sign.    Biliary system: The common duct is not dilated, measuring 5 mm.  No intrahepatic ductal dilatation.    Pancreas: Obscured by overlying bowel gas.  The visualized portions are unremarkable.    Spleen: Unremarkable, measuring 10.9 cm.    Miscellaneous: No upper abdominal ascites.                                        Medications   aluminum-magnesium hydroxide-simethicone 200-200-20 mg/5 mL suspension 30 mL (30 mLs Oral Given 3/8/25 0545)     And   LIDOcaine viscous HCl 2% oral solution 15 mL (15 mLs Oral Given 3/8/25 0545)   ondansetron injection 4 mg (4 mg Intravenous Given 3/8/25 0548)   acetaminophen tablet 500 mg (500 mg Oral Given 3/8/25 0821)   ketorolac injection 15 mg (15 mg Intravenous Given 3/8/25 0817)   famotidine (PF) injection 20 mg (20 mg Intravenous Given 3/8/25 0817)     Medical Decision Making  Encounter Date: 3/8/2025  --------------------------------------------------------------------------  34 y.o. adult presents for evaluation of abdominal pain.  Hemodynamically stable. Afebrile. Phonating and protecting the airway spontaneously. No clinical evidence for cardiovascular instability or impending airway compromise.  Remainder of physical exam as above.    Additional or Independent Historians available and contributing to the history as above: NONE  Prior medical records, when available, were reviewed. This includes a review of the patients comorbidities, medications, and recent hospital or outpatient notes.   Comorbid Conditions affecting evaluation, treatment or discharge planning: NONE IDENTIFIED   Social Determinates of Health identified and considered in the evaluation and treatment of this patient: None Identified or significantly impacting evaluation and treatment    Differential diagnoses includes but is not limited to:   AAA, aortic dissection, mesenteric ischemia, perforated viscous, MI/ACS, SBO/volvulus, incarcerated/strangulated hernia, intussusception, ileus, appendicitis, cholecystitis, cholangitis, diverticulitis, esophagitis, hepatitis, nephrolithiasis, pancreatitis, gastroenteritis, colitis, IBD/IBS, biliary colic, GERD, PUD, constipation, UTI/pyelonephritis,  disorder.  --------------------------------------------------------------------------  Jill Waller PA-C  dictating   Pt c/o 3/10 pain    Toradol IV Tylenol p.o. and Pepcid IV given.  Patient has no abdominal tenderness remaining on serial abdominal exam  CBC without leukocytosis or significant anemia.  CMP with no renal insufficiency or significant electrolyte abnormality.  Lipase normal.  UPT negative.  UA negative for UTI.  No abdominal tenderness remaining.  Considered but low suspicion for acute surgical abdomen.  Ultrasound withhepatic steatosis and hepatomegaly.  No evidence of biliary obstruction and acute cholecystitis choledocholithiasis cholangitis.  Will refer to GI for further evaluation management.  Will discharge with medications for symptomatic treatment.  Return ER for worsening or as needed.      Amount and/or Complexity of Data Reviewed  Labs: ordered. Decision-making details documented in ED Course.  Radiology: ordered.    Risk  OTC drugs.  Prescription drug management.               ED Course as of 03/08/25 0823   Sat Mar 08, 2025   0625 BP: 139/76 [AN]   0625 Temp: 97.6 °F (36.4 °C) [AN]   0625 Pulse: 89 [AN]   0625 Resp: 18 [AN]   0625 SpO2: 99 % [AN]   0625 CBC W/ AUTO DIFFERENTIAL(!)  No leukocytosis.  No anemia.  Normal platelets. [AN]   0626 Comp. Metabolic Panel(!)  No significant metabolic abnormality.  Normal hepatic and renal function. [AN]   0626 Lipase [AN]   0626 Patient feels improved after GI cocktail.  Still some residual abdominal TTP midepigastrium. [AN]   0626 Patient signed out to JERRELL Gar PA-C pending imaging findings and disposition as per clinical course. [AN]      ED Course User Index  [AN] Oscar Overton PA-C                           Clinical Impression:  Final diagnoses:  [R10.13] Epigastric pain (Primary)  [K76.0] Hepatic steatosis          ED Disposition Condition    Discharge Stable          ED Prescriptions       Medication Sig Dispense Start Date End Date Auth. Provider    pantoprazole (PROTONIX) 40 MG tablet Take 1 tablet (40 mg total) by mouth once daily. 30  tablet 3/8/2025 4/7/2025 Jill Gar PA-C    aluminum-magnesium hydroxide-simethicone (MAALOX ADVANCED) 200-200-20 mg/5 mL Susp Take 30 mLs by mouth 4 (four) times daily before meals and nightly. for 7 days 354 mL 3/8/2025 3/15/2025 Jill Gar PA-C    ondansetron (ZOFRAN-ODT) 4 MG TbDL Take 1 tablet (4 mg total) by mouth every 6 (six) hours as needed (for nausea). 15 tablet 3/8/2025 3/13/2025 Jill Gar PA-C    promethazine (PHENERGAN) 25 MG tablet Take 1 tablet (25 mg total) by mouth every 6 (six) hours as needed for Nausea. 15 tablet 3/8/2025 -- Jill Gar PA-C          Follow-up Information       Follow up With Specialties Details Why Contact Info Additional Information    Your Primary Care Doctor  Schedule an appointment as soon as possible for a visit in 2 days       [facility] Emergency Dept Emergency Medicine Go to  As needed, If symptoms worsen [address] [facility] [place] [address] [phone]     [facility] [address] Gastroenterology Schedule an appointment as soon as possible for a visit in 2 days for follow up [address] [facility] [address] [phone] GI Center & Urology - Main Building, 4th Floor Please park in [facility] and take Atrium elevator                 [1]   Social History  Tobacco Use    Smoking status: Never    Smokeless tobacco: Never   Substance Use Topics    Alcohol use: No    Drug use: No        Jill Gar PA-C  03/08/25 0884

## 2025-03-17 NOTE — TELEPHONE ENCOUNTER
----- Message from Irene Blancas sent at 3/6/2018 12:57 PM CST -----  Contact: Self  Pt calling to speak to a nurse regarding iud. [phone].  
Spoke with pt. Appointment scheduled   
17

## 2025-03-26 ENCOUNTER — PATIENT MESSAGE (OUTPATIENT)
Dept: ENDOSCOPY | Facility: HOSPITAL | Age: 35
End: 2025-03-26
Payer: MEDICAID

## 2025-03-26 ENCOUNTER — OFFICE VISIT (OUTPATIENT)
Dept: GASTROENTEROLOGY | Facility: CLINIC | Age: 35
End: 2025-03-26
Payer: MEDICAID

## 2025-03-26 ENCOUNTER — TELEPHONE (OUTPATIENT)
Dept: ENDOSCOPY | Facility: HOSPITAL | Age: 35
End: 2025-03-26
Payer: MEDICAID

## 2025-03-26 VITALS
BODY MASS INDEX: 47.33 KG/M2 | WEIGHT: 284.06 LBS | DIASTOLIC BLOOD PRESSURE: 81 MMHG | HEART RATE: 87 BPM | HEIGHT: 65 IN | SYSTOLIC BLOOD PRESSURE: 117 MMHG

## 2025-03-26 DIAGNOSIS — D50.9 IRON DEFICIENCY ANEMIA, UNSPECIFIED IRON DEFICIENCY ANEMIA TYPE: Primary | ICD-10-CM

## 2025-03-26 DIAGNOSIS — K76.0 HEPATIC STEATOSIS: ICD-10-CM

## 2025-03-26 DIAGNOSIS — R10.9 ABDOMINAL PAIN, UNSPECIFIED ABDOMINAL LOCATION: Primary | ICD-10-CM

## 2025-03-26 DIAGNOSIS — Z80.0 FAMILY HX OF COLON CANCER: ICD-10-CM

## 2025-03-26 DIAGNOSIS — Z12.11 SPECIAL SCREENING FOR MALIGNANT NEOPLASMS, COLON: ICD-10-CM

## 2025-03-26 DIAGNOSIS — R10.13 EPIGASTRIC PAIN: ICD-10-CM

## 2025-03-26 PROCEDURE — 99204 OFFICE O/P NEW MOD 45 MIN: CPT | Mod: S$PBB,,, | Performed by: STUDENT IN AN ORGANIZED HEALTH CARE EDUCATION/TRAINING PROGRAM

## 2025-03-26 PROCEDURE — 3079F DIAST BP 80-89 MM HG: CPT | Mod: CPTII,,, | Performed by: STUDENT IN AN ORGANIZED HEALTH CARE EDUCATION/TRAINING PROGRAM

## 2025-03-26 PROCEDURE — 3008F BODY MASS INDEX DOCD: CPT | Mod: CPTII,,, | Performed by: STUDENT IN AN ORGANIZED HEALTH CARE EDUCATION/TRAINING PROGRAM

## 2025-03-26 PROCEDURE — 99999 PR PBB SHADOW E&M-EST. PATIENT-LVL IV: CPT | Mod: PBBFAC,,, | Performed by: STUDENT IN AN ORGANIZED HEALTH CARE EDUCATION/TRAINING PROGRAM

## 2025-03-26 PROCEDURE — 1159F MED LIST DOCD IN RCRD: CPT | Mod: CPTII,,, | Performed by: STUDENT IN AN ORGANIZED HEALTH CARE EDUCATION/TRAINING PROGRAM

## 2025-03-26 PROCEDURE — 3074F SYST BP LT 130 MM HG: CPT | Mod: CPTII,,, | Performed by: STUDENT IN AN ORGANIZED HEALTH CARE EDUCATION/TRAINING PROGRAM

## 2025-03-26 PROCEDURE — 99214 OFFICE O/P EST MOD 30 MIN: CPT | Mod: PBBFAC | Performed by: STUDENT IN AN ORGANIZED HEALTH CARE EDUCATION/TRAINING PROGRAM

## 2025-03-26 RX ORDER — FERROUS GLUCONATE 324(38)MG
1 TABLET ORAL DAILY
COMMUNITY
Start: 2025-02-27

## 2025-03-26 RX ORDER — ERGOCALCIFEROL 1.25 MG/1
1 CAPSULE ORAL
COMMUNITY
Start: 2025-02-27

## 2025-03-26 NOTE — TELEPHONE ENCOUNTER
Called pt. To schedule EGD/Colon for Dr. Tejada. No answer. Left VM message to call back. Sent Msg.

## 2025-03-26 NOTE — TELEPHONE ENCOUNTER
"Sonali MCKINNON MD  P [facility] Endoscopist Clinic Patients  Procedure: EGD/Colonoscopy    Diagnosis: Screening colonoscopy, family hx of colon cancer, abdominal pain    Procedure Timin-12 weeks    *If within 4 weeks selected, please yuli as high priority*    *If greater than 12 weeks, please select "5-12 weeks" and delay sending until 3 months prior to requested date*    Location:     Additional Scheduling Information: No scheduling concerns    Prep Specifications:PEG    Is the patient taking a GLP-1 Agonist:no    Have you attached a patient to this message: yes  "

## 2025-03-26 NOTE — PATIENT INSTRUCTIONS
FD Max over the counter   Miralax everyday for constipation  Referral to a hematologist for IV iron  We will do an upper endoscopy and colonoscopy

## 2025-03-26 NOTE — TELEPHONE ENCOUNTER
Referral for procedure from Telephone call       Spoke to pt to schedule procedure(s) Colonoscopy/EGD       Physician to perform procedure(s) Dr. HERIBERTO Waterman  Date of Procedure (s) 4/30/25  Arrival Time 9:00 AM  Time of Procedure(s) 10:00 AM   Location of Procedure(s) [address]   Type of Rx Prep sent to patient: PEG  Instructions provided to patient via [facility]    Patient was informed on the following information and verbalized understanding. Screening questionnaire reviewed with patient and complete. If procedure requires anesthesia, a responsible adult needs to be present to accompany the patient home, patient cannot drive after receiving anesthesia. Appointment details are tentative, especially check-in time. Patient will receive a prep-op call 7 days prior to confirm check-in time for procedure. If applicable the patient should contact their pharmacy to verify Rx for procedure prep is ready for pick-up. Patient was advised to call the scheduling department at [phone] if pharmacy states no Rx is available. Patient was advised to call the endoscopy scheduling department if any questions or concerns arise.      [facility] Endoscopy Scheduling Department

## 2025-03-26 NOTE — PROGRESS NOTES
Gastroenterology Clinic    Reason for visit: Diagnoses of Epigastric pain and Hepatic steatosis were pertinent to this visit.  Referring Provider/PCP: No, Primary Doctor    History of Present Illness:  Shewalter Huddleston is a 34 y.o. female with a history of obesity who is presenting for initial evaluation of abdominal pain.    Previously seen at [facility] in 2016 for constipation.  Notes reviewed.  MiraLax and Colace were recommended.  If no improvement, they were planning on starting secretogogues.  She was then seen at [facility] for multiple symptoms including dysphagia, abdominal pain, bloating.  Underwent an EGD 3/2021, images reviewed, normal exam, biopsies unremarkable.  Most recently 2 weeks ago she went to the emergency department for acute onset abdominal pain that started early in the morning associated with nausea and vomiting.  Notes from the ER reviewed.  Labs including CBC, CMP, lipase were unremarkable.  Slightly elevated AST at 41, ALT 28.  Abdominal ultrasound unremarkable except for mild hepatomegaly and steatosis.  She was given a GI cocktail and referred to GI.    Since then she has not had that pain but she feels some abdominal discomfort after she eats.  She feels full quickly.  Reports that she has a malodorous smell from her mouth which she thinks is from retained food. She wakes up with blood in her mouth occasionally, not sure if that is from her mouth or GI tract.  She had a a colon resection when she was baby, unclear what was the cause, could be enterocolitis or Hirschsprung.  She has a long history of constipation which is worse when she takes iron supplements.  She has heavy periods.  Previously had IUD but had it removed due to mood changes.  She stopped taking iron after her ER visit.  She has a bowel movement every 2-3 days, she drinks a lot of water.  Family history of colon cancer in her father, diagnosed at age 42.       Physical Exam:  Constitutional:  not in acute distress and  well developed  HENT: Head: Normal, normocephalic, atraumatic.  Eyes: conjunctiva clear and sclera nonicteric  Skin: normal color  Neurological: alert, oriented x3  Psychiatric: mood and affect are within normal limits, pt is a good historian; no memory problems were noted    Laboratory:  Lab Results   Component Value Date/Time    HGB 12.3 03/08/2025 05:44 AM    HGB 11.2 (L) 06/21/2019 09:30 PM    AST 41 (H) 03/08/2025 05:44 AM    AST 13 02/27/2025 10:06 AM    AST 11 04/04/2024 10:10 AM    AST 17 06/21/2019 09:30 PM    ALT 28 03/08/2025 05:44 AM    ALT 13 02/27/2025 10:06 AM    ALT 9 04/04/2024 10:10 AM    ALT 16 06/21/2019 09:30 PM    BILITOT 0.3 03/08/2025 05:44 AM    BILITOT 0.2 02/27/2025 10:06 AM    BILITOT 0.2 04/04/2024 10:10 AM    BILITOT 0.2 06/21/2019 09:30 PM     Reviewed.  Normal Hgb    Imaging:  No pertinent imaging.    Endoscopy:  See HPI    Assessment:  Martin Huddleston is a 34 y.o. female who is presenting for initial evaluation of abdominal pain, constipation    Problems:  Abdominal pain  I think what brought her to the emergency department was likely acute gastroenteritis which has now resolved.  Now with epigastric abdominal discomfort, postprandial.  Early satiety which is a chronic problem.  Has seen blood in her mouth a few times.  Will plan for EGD to rule out GI bleeding, peptic ulcer disease especially in the setting of iron-deficiency anemia.  Will start FDgard.  If there is no improvement, can consider gastric emptying study.    Constipation  Chronic history of constipation, could be due to the surgery that she had as a baby.  Could also be from global hypomotility, worsened with oral iron.  Will refer to hematology for iron infusions.  Recommended increasing water intake, high-fiber diet, MiraLax daily.  The patient is reluctant about medications and them causing cancers, reassured.    ADAM  Likely due to menorrhagia. Seen by gyn in the past but she's not happy with the contraceptives that  were offered.   Will refer to hematology for iron infusions due to worsened constipation with iron iron.  Currently Hgb is normal but she just stopped taking the iron.    Family hx of colon cancer  Father was diagnosed with colon cancer at age 42.  She is due for colon cancer screening, will plan for colonoscopy.    Plan:  EGD/colonoscopy, higher than average risk due to BMI  Miralax daily  FD Max  Hematology referral    Sonali Waterman MD  Gastroenterology and Hepatology    No orders of the defined types were placed in this encounter.

## 2025-03-27 ENCOUNTER — TELEPHONE (OUTPATIENT)
Dept: HEMATOLOGY/ONCOLOGY | Facility: CLINIC | Age: 35
End: 2025-03-27
Payer: MEDICAID

## 2025-03-27 NOTE — TELEPHONE ENCOUNTER
----- Message from Med Assistant Del Valle sent at 3/26/2025 11:04 AM CDT -----  Good morning,Dr Waterman has put in a referral for patient. At your convenience can you reach out to patient for scheduling, thank you.Dx D50.9 (ICD-10-CM) - Iron deficiency anemia, unspecified iron deficiency anemia type    Left message to call office to schedule

## 2025-04-08 ENCOUNTER — TELEPHONE (OUTPATIENT)
Dept: HEMATOLOGY/ONCOLOGY | Facility: CLINIC | Age: 35
End: 2025-04-08
Payer: MEDICAID

## 2025-04-08 DIAGNOSIS — D50.9 IRON DEFICIENCY ANEMIA, UNSPECIFIED IRON DEFICIENCY ANEMIA TYPE: Primary | ICD-10-CM

## 2025-04-21 ENCOUNTER — LAB VISIT (OUTPATIENT)
Dept: LAB | Facility: HOSPITAL | Age: 35
End: 2025-04-21
Attending: INTERNAL MEDICINE
Payer: MEDICAID

## 2025-04-21 DIAGNOSIS — D50.9 IRON DEFICIENCY ANEMIA, UNSPECIFIED IRON DEFICIENCY ANEMIA TYPE: ICD-10-CM

## 2025-04-21 LAB
ABSOLUTE EOSINOPHIL (OHS): 0.13 K/UL
ABSOLUTE MONOCYTE (OHS): 0.42 K/UL (ref 0.3–1)
ABSOLUTE NEUTROPHIL COUNT (OHS): 5.46 K/UL (ref 1.8–7.7)
ALBUMIN SERPL BCP-MCNC: 3.8 G/DL (ref 3.5–5.2)
ALP SERPL-CCNC: 55 UNIT/L (ref 40–150)
ALT SERPL W/O P-5'-P-CCNC: 11 UNIT/L (ref 10–44)
ANION GAP (OHS): 9 MMOL/L (ref 8–16)
AST SERPL-CCNC: 14 UNIT/L (ref 11–45)
BASOPHILS # BLD AUTO: 0.02 K/UL
BASOPHILS NFR BLD AUTO: 0.2 %
BILIRUB SERPL-MCNC: 0.2 MG/DL (ref 0.1–1)
BUN SERPL-MCNC: 8 MG/DL (ref 6–20)
CALCIUM SERPL-MCNC: 9.4 MG/DL (ref 8.7–10.5)
CHLORIDE SERPL-SCNC: 105 MMOL/L (ref 95–110)
CO2 SERPL-SCNC: 26 MMOL/L (ref 23–29)
CREAT SERPL-MCNC: 0.6 MG/DL (ref 0.5–1.4)
ERYTHROCYTE [DISTWIDTH] IN BLOOD BY AUTOMATED COUNT: 13.7 % (ref 11.5–14.5)
FERRITIN SERPL-MCNC: 22.1 NG/ML (ref 20–300)
GFR SERPLBLD CREATININE-BSD FMLA CKD-EPI: >60 ML/MIN/1.73/M2
GLUCOSE SERPL-MCNC: 85 MG/DL (ref 70–110)
HCT VFR BLD AUTO: 38.7 % (ref 37–48.5)
HGB BLD-MCNC: 12.4 GM/DL (ref 12–16)
IMM GRANULOCYTES # BLD AUTO: 0.04 K/UL (ref 0–0.04)
IMM GRANULOCYTES NFR BLD AUTO: 0.5 % (ref 0–0.5)
IRON SATN MFR SERPL: 12 % (ref 20–50)
IRON SERPL-MCNC: 46 UG/DL (ref 30–160)
LYMPHOCYTES # BLD AUTO: 2.57 K/UL (ref 1–4.8)
MCH RBC QN AUTO: 27 PG (ref 27–31)
MCHC RBC AUTO-ENTMCNC: 32 G/DL (ref 32–36)
MCV RBC AUTO: 84 FL (ref 82–98)
NUCLEATED RBC (/100WBC) (OHS): 0 /100 WBC
PLATELET # BLD AUTO: 288 K/UL (ref 150–450)
PMV BLD AUTO: 9.5 FL (ref 9.2–12.9)
POTASSIUM SERPL-SCNC: 3.8 MMOL/L (ref 3.5–5.1)
PROT SERPL-MCNC: 8.3 GM/DL (ref 6–8.4)
RBC # BLD AUTO: 4.59 M/UL (ref 4–5.4)
RELATIVE EOSINOPHIL (OHS): 1.5 %
RELATIVE LYMPHOCYTE (OHS): 29.7 % (ref 18–48)
RELATIVE MONOCYTE (OHS): 4.9 % (ref 4–15)
RELATIVE NEUTROPHIL (OHS): 63.2 % (ref 38–73)
SODIUM SERPL-SCNC: 140 MMOL/L (ref 136–145)
TIBC SERPL-MCNC: 389 UG/DL (ref 250–450)
TRANSFERRIN SERPL-MCNC: 263 MG/DL (ref 200–375)
WBC # BLD AUTO: 8.64 K/UL (ref 3.9–12.7)

## 2025-04-21 PROCEDURE — 85025 COMPLETE CBC W/AUTO DIFF WBC: CPT

## 2025-04-21 PROCEDURE — 36415 COLL VENOUS BLD VENIPUNCTURE: CPT | Mod: PO

## 2025-04-21 PROCEDURE — 83921 ORGANIC ACID SINGLE QUANT: CPT

## 2025-04-21 PROCEDURE — 84466 ASSAY OF TRANSFERRIN: CPT

## 2025-04-21 PROCEDURE — 84238 ASSAY NONENDOCRINE RECEPTOR: CPT

## 2025-04-21 PROCEDURE — 82728 ASSAY OF FERRITIN: CPT

## 2025-04-21 PROCEDURE — 82374 ASSAY BLOOD CARBON DIOXIDE: CPT

## 2025-04-22 LAB — STFR SERPL-MCNC: 4.2 MG/L (ref 1.8–4.6)

## 2025-04-24 ENCOUNTER — OFFICE VISIT (OUTPATIENT)
Dept: HEMATOLOGY/ONCOLOGY | Facility: CLINIC | Age: 35
End: 2025-04-24
Payer: MEDICAID

## 2025-04-24 VITALS
TEMPERATURE: 98 F | HEIGHT: 65 IN | HEART RATE: 93 BPM | SYSTOLIC BLOOD PRESSURE: 111 MMHG | OXYGEN SATURATION: 99 % | BODY MASS INDEX: 47.42 KG/M2 | WEIGHT: 284.63 LBS | DIASTOLIC BLOOD PRESSURE: 79 MMHG

## 2025-04-24 DIAGNOSIS — Z80.0 FAMILY HISTORY OF COLON CANCER: ICD-10-CM

## 2025-04-24 DIAGNOSIS — K59.00 CONSTIPATION, UNSPECIFIED CONSTIPATION TYPE: ICD-10-CM

## 2025-04-24 DIAGNOSIS — N92.4 EXCESSIVE BLEEDING IN PREMENOPAUSAL PERIOD: ICD-10-CM

## 2025-04-24 DIAGNOSIS — R10.9 ABDOMINAL PAIN, UNSPECIFIED ABDOMINAL LOCATION: ICD-10-CM

## 2025-04-24 DIAGNOSIS — E61.1 IRON DEFICIENCY: Primary | ICD-10-CM

## 2025-04-24 PROCEDURE — 99214 OFFICE O/P EST MOD 30 MIN: CPT | Mod: PBBFAC | Performed by: INTERNAL MEDICINE

## 2025-04-24 PROCEDURE — 3074F SYST BP LT 130 MM HG: CPT | Mod: CPTII,,, | Performed by: INTERNAL MEDICINE

## 2025-04-24 PROCEDURE — 99999 PR PBB SHADOW E&M-EST. PATIENT-LVL IV: CPT | Mod: PBBFAC,,, | Performed by: INTERNAL MEDICINE

## 2025-04-24 PROCEDURE — G2211 COMPLEX E/M VISIT ADD ON: HCPCS | Mod: S$PBB,,, | Performed by: INTERNAL MEDICINE

## 2025-04-24 PROCEDURE — 3078F DIAST BP <80 MM HG: CPT | Mod: CPTII,,, | Performed by: INTERNAL MEDICINE

## 2025-04-24 PROCEDURE — 3008F BODY MASS INDEX DOCD: CPT | Mod: CPTII,,, | Performed by: INTERNAL MEDICINE

## 2025-04-24 PROCEDURE — 99204 OFFICE O/P NEW MOD 45 MIN: CPT | Mod: S$PBB,,, | Performed by: INTERNAL MEDICINE

## 2025-04-24 RX ORDER — SODIUM CHLORIDE 0.9 % (FLUSH) 0.9 %
10 SYRINGE (ML) INJECTION
Status: CANCELLED | OUTPATIENT
Start: 2025-04-29

## 2025-04-24 RX ORDER — EPINEPHRINE 0.3 MG/.3ML
0.3 INJECTION SUBCUTANEOUS ONCE AS NEEDED
Status: CANCELLED | OUTPATIENT
Start: 2025-04-29

## 2025-04-24 RX ORDER — HEPARIN 100 UNIT/ML
500 SYRINGE INTRAVENOUS
Status: CANCELLED | OUTPATIENT
Start: 2025-04-29

## 2025-04-24 NOTE — PATIENT INSTRUCTIONS
Physician to perform procedure(s) Dr. HERIBERTO Waterman  Date of Procedure  4/30/25  Arrival Time 9:00 AM  Time of Procedure(s) 10:00 AM   Location of Procedure(s) [facility]  2nd Floor        .     Patient will receive a prep-op call 7 days prior to confirm check-in time for procedure. If applicable the patient should contact their pharmacy to verify Rx for procedure prep is ready for pick-up. Patient was advised to call the scheduling department at [phone] if pharmacy states no Rx is available. Patient was advised to call the endoscopy scheduling department if any questions or concerns arise.

## 2025-04-24 NOTE — PROGRESS NOTES
PATIENT: Martin Huddleston  MRN: [id number]  DATE: 4/24/2025      Diagnosis:   1. Iron deficiency        Chief Complaint: new patient            Subjective:    Initial History: Mrs. Huddleston is a 34 y.o. female with Iron Deficiency     History of Present Illness    CHIEF COMPLAINT:  Patient was referred by gastroenterologist presents today for evaluation of anemia.    REFERRING PROVIDER:  Referred by gastroenterologist    HPI:  34-year-old medical diagnoses as listed seen today in consultation for iron deficiency.  Patient presented to the ED on March 26, 2025 for right upper quadrant pain with dyspnea.Pain has mostly subsided, with only occasional mild discomfort. She has a long history of constipation that worsens with iron supplementation, leading to the discontinuation of iron supplements on 3/26/25 Previously evaluated at [facility] in 2016 for constipation, she was recommended Miralax and Colace and is currently using Lomarset detox tea for management. She reports following a no-carbohydrate, no-sugar diet prior to the acute pain episode. Family history is significant for paternal colon cancer .She reports menorrhagia lasting 6 days, with heaviest flow on days 2-3, and her last menstrual period was approximately 3/30/25. She has a history of IUD, which is now removed, and denies any history of blood transfusions. Recent labs show no current anemia, though iron saturation is slightly low. She reports chronic fatigue, weakness, and somnolence, along with a history of sinus problems affecting taste perception.        ROS:  General: -fever, -chills, +fatigue, -weight gain, -weight loss  Eyes: -vision changes, -redness, -discharge  ENT: -ear pain, -nasal congestion, -sore throat  Cardiovascular: -chest pain, -palpitations, -lower extremity edema  Respiratory: -cough, -shortness of breath, -difficulty breathing  Gastrointestinal: -abdominal pain, -nausea, -vomiting, -diarrhea, +constipation, -blood in stool  Genitourinary: -dysuria,  "-hematuria, -frequency  Musculoskeletal: -joint pain, -muscle pain  Skin: -rash, -lesion  Neurological: -headache, -dizziness, -numbness, -tingling  Psychiatric: -anxiety, -depression, -sleep difficulty  Female Genitourinary: +excessive vaginal bleeding          Past Medical History:   Past Medical History:   Diagnosis Date    Constipation     PCOS (polycystic ovarian syndrome)        Past Surgical HIstory:   Past Surgical History:   Procedure Laterality Date    intestinal surg       as a child    SINUS SURGERY  02/02/2017    VAGINAL DELIVERY         Family History:   Family History   Problem Relation Name Age of Onset    Colon cancer Father      Diabetes Mother      Hypertension Mother         Social History:  reports that she has never smoked. She has never been exposed to tobacco smoke. She has never used smokeless tobacco. She reports that she does not drink alcohol and does not use drugs.    Allergies:  Review of patient's allergies indicates:  No Known Allergies        Objective:      Vitals:   Vitals:    04/24/25 1355   BP: 111/79   Pulse: 93   Temp: 97.8 °F (36.6 °C)   SpO2: 99%   Weight: 129.1 kg (284 lb 9.8 oz)   Height: 5' 5" (1.651 m)         Physical Exam    General: No acute distress. Well-developed. Well-nourished.  Eyes: EOMI. Sclerae anicteric.  HENT: Normocephalic. Atraumatic. Nares patent. Moist oral mucosa.  Cardiovascular: Regular rate. Regular rhythm. No murmurs. No rubs.  Normal S1, S2.  Respiratory: Normal respiratory effort. Clear to auscultation bilaterally. No rales. No rhonchi. No wheezing.  Abdomen: Soft. Non-tender. Non-distended. Normoactive bowel sounds.  Musculoskeletal: No  obvious deformity.  Extremities: No lower extremity edema.  Neurological: Alert & oriented x3. No slurred speech. Normal gait.  Psychiatric: Normal mood. Normal affect. Good insight. Good judgment.  Skin: Warm. Dry. No rash.              Laboratory Data:  Lab Visit on 04/21/2025   Component Date Value Ref Range " Status    Iron Level 04/21/2025 46  30 - 160 ug/dL Final    Transferrin 04/21/2025 263  200 - 375 mg/dL Final    Iron Binding Capacity Total 04/21/2025 389  250 - 450 ug/dL Final    Iron Saturation 04/21/2025 12 (L)  20 - 50 % Final    Ferritin 04/21/2025 22.1  20.0 - 300.0 ng/mL Final    Sodium 04/21/2025 140  136 - 145 mmol/L Final    Potassium 04/21/2025 3.8  3.5 - 5.1 mmol/L Final    Chloride 04/21/2025 105  95 - 110 mmol/L Final    CO2 04/21/2025 26  23 - 29 mmol/L Final    Glucose 04/21/2025 85  70 - 110 mg/dL Final    BUN 04/21/2025 8  6 - 20 mg/dL Final    Creatinine 04/21/2025 0.6  0.5 - 1.4 mg/dL Final    Calcium 04/21/2025 9.4  8.7 - 10.5 mg/dL Final    Protein Total 04/21/2025 8.3  6.0 - 8.4 gm/dL Final    Albumin 04/21/2025 3.8  3.5 - 5.2 g/dL Final    Bilirubin Total 04/21/2025 0.2  0.1 - 1.0 mg/dL Final    For infants and newborns, interpretation of results should be based   on gestational age, weight and in agreement with clinical   observations.    Premature Infant recommended reference ranges:   0-24 hours:  <8.0 mg/dL   24-48 hours: <12.0 mg/dL   3-5 days:    <15.0 mg/dL   6-29 days:   <15.0 mg/dL    ALP 04/21/2025 55  40 - 150 unit/L Final    AST 04/21/2025 14  11 - 45 unit/L Final    ALT 04/21/2025 11  10 - 44 unit/L Final    Anion Gap 04/21/2025 9  8 - 16 mmol/L Final    eGFR 04/21/2025 >60  >60 mL/min/1.73/m2 Final    Estimated GFR calculated using the CKD-EPI creatinine (2021) equation.    Soluble Transferrin Receptor (sTfR) 04/21/2025 4.2  1.8 - 4.6 mg/L Final       -------------------ADDITIONAL INFORMATION-------------------  It is reported that    Americans may   have slightly   higher values.     Test Performed by:  [facility] - [address] [address]  : Kermit Hill Ph.D.; CLIA# [id number]    WBC 04/21/2025 8.64  3.90 - 12.70 K/uL Final    RBC 04/21/2025 4.59  4.00 - 5.40 M/uL Final    HGB 04/21/2025 12.4   12.0 - 16.0 gm/dL Final    HCT 04/21/2025 38.7  37.0 - 48.5 % Final    MCV 04/21/2025 84  82 - 98 fL Final    MCH 04/21/2025 27.0  27.0 - 31.0 pg Final    MCHC 04/21/2025 32.0  32.0 - 36.0 g/dL Final    RDW 04/21/2025 13.7  11.5 - 14.5 % Final    Platelet Count 04/21/2025 288  150 - 450 K/uL Final    MPV 04/21/2025 9.5  9.2 - 12.9 fL Final    Nucleated RBC 04/21/2025 0  <=0 /100 WBC Final    Neut % 04/21/2025 63.2  38 - 73 % Final    Lymph % 04/21/2025 29.7  18 - 48 % Final    Mono % 04/21/2025 4.9  4 - 15 % Final    Eos % 04/21/2025 1.5  <=8 % Final    Basophil % 04/21/2025 0.2  <=1.9 % Final    Imm Grans % 04/21/2025 0.5  0.0 - 0.5 % Final    Neut # 04/21/2025 5.46  1.8 - 7.7 K/uL Final    Lymph # 04/21/2025 2.57  1 - 4.8 K/uL Final    Mono # 04/21/2025 0.42  0.3 - 1 K/uL Final    Eos # 04/21/2025 0.13  <=0.5 K/uL Final    Baso # 04/21/2025 0.02  <=0.2 K/uL Final    Imm Grans # 04/21/2025 0.04  0.00 - 0.04 K/uL Final    Mild elevation in immature granulocytes is non specific and can be seen in a variety of conditions including stress response, acute inflammation, trauma and pregnancy. Correlation with other laboratory and clinical findings is essential.         Imaging:     US Abdomen Limited  Narrative: EXAMINATION:  US ABDOMEN LIMITED    CLINICAL HISTORY:  abdominal pain;    TECHNIQUE:  Limited ultrasound of the right upper quadrant of the abdomen  was performed.    COMPARISON:  None.    FINDINGS:  Liver: Enlarged, measuring 21.7 cm in craniocaudal extent.  Mild diffuse increased echogenicity .   focal hepatic lesions.    Gallbladder: No calculi, wall thickening, or pericholecystic fluid.  No sonographic Alvarenga's sign.    Biliary system: The common duct is not dilated, measuring 5 mm.  No intrahepatic ductal dilatation.    Pancreas: Obscured by overlying bowel gas.  The visualized portions are unremarkable.    Spleen: Unremarkable, measuring 10.9 cm.    Miscellaneous: No upper abdominal ascites.  Impression: No  sonographic evidence of cholelithiasis or biliary obstruction.    Mild hepatomegaly with subtle diffuse increased hepatic parenchymal echogenicity suggesting the possibility of steatosis.    Electronically signed by: Duarte Velarde MD  Date:    03/08/2025  Time:    07:27       Assessment:       1. Iron deficiency  CBC Auto Differential    Ferritin    Iron and TIBC           Assessment & Plan    IMPRESSION:      IRON DEFICIENCY:  Likely due to menorrhagia.  Labs reviewed.   Hb wnl   Iron saturation slightly low without anemia   Discontinued iron supplements on March 26th 2025  Intolerant of Fe supp-she stopped taking Fe   Plan iron infusions with  2 weekly doses of intravenous iron   GI eval planned including EGD and colonoscopy recommended by gastroenterologist. Procedure scheduled for April 30th, 2025.  - Mistakenly prepared for colonoscopy today, will need to repeat preparation.      ABDOMINAL PAIN  - Pain has improved   Followed by GI   GI eval planned    Menorrhagia  Contributing to iron deficiency  Follow up with Gyn     FAMILY HISTORY OF COLON CANCER  -.Family history of colon cancer in her father, diagnosed at age 42.   GI eval planned     CONSTIPATION  - Long history of constipation, worsened by iron supplements. Discontinued iron supplements on March 26th.  Followed by GI recommended increasing water intake, high-fiber diet, MiraLax daily.   GI eval planned       Cbc, FE studies prior to f/u 6 weeks   Plan IV Fe - chemo to schedule       Visit today included increased complexity associated with the care of the episodic problem constipation addressed and managing the longitudinal care of the patient due to the serious and/or complex managed problem(s) Iron deficiency    Jaimee Cheng MD  [facility]-  Hematology and Oncology  [address] [facility] , [facility] [address]  [place] [address]  O: [phone] F: [phone]    This note was generated with the assistance of [employer] technology.  Verbal consent was obtained by the patient and accompanying visitor(s) for the recording of patient appointment to facilitate this note. I attest to having reviewed and edited the generated note for accuracy, though some syntax or spelling errors may persist. Please contact the author of this note for any clarification.

## 2025-04-25 LAB — W METHYLMALONIC ACID: 0.15 UMOL/L

## 2025-04-28 ENCOUNTER — TELEPHONE (OUTPATIENT)
Dept: INFUSION THERAPY | Facility: HOSPITAL | Age: 35
End: 2025-04-28
Payer: MEDICAID

## 2025-04-29 ENCOUNTER — ANESTHESIA EVENT (OUTPATIENT)
Dept: ENDOSCOPY | Facility: HOSPITAL | Age: 35
End: 2025-04-29

## 2025-04-30 ENCOUNTER — ANESTHESIA (OUTPATIENT)
Dept: ENDOSCOPY | Facility: HOSPITAL | Age: 35
End: 2025-04-30

## 2025-04-30 ENCOUNTER — TELEPHONE (OUTPATIENT)
Dept: ENDOSCOPY | Facility: HOSPITAL | Age: 35
End: 2025-04-30
Payer: MEDICAID

## 2025-04-30 NOTE — ANESTHESIA PREPROCEDURE EVALUATION
04/30/2025  Martin Huddleston is a 34 y.o., female.  To undergo Procedure(s) (LRB):  EGD (ESOPHAGOGASTRODUODENOSCOPY) (N/A)  COLONOSCOPY, SCREENING, HIGH RISK PATIENT (N/A)       Past Medical History:  Past Medical History:   Diagnosis Date    Constipation     PCOS (polycystic ovarian syndrome)        Past Surgical History:  Past Surgical History:   Procedure Laterality Date    intestinal surg       as a child    SINUS SURGERY  02/02/2017    VAGINAL DELIVERY         Social History:  Social History[1]    Medications:  Medications Ordered Prior to Encounter[2]    Allergies:  Review of patient's allergies indicates:  No Known Allergies    Active Problems:  Problem List[3]    Diagnostic Studies:   Latest Reference Range & Units 04/21/25 12:19   WBC 3.90 - 12.70 K/uL 8.64   RBC 4.00 - 5.40 M/uL 4.59   Hemoglobin 12.0 - 16.0 gm/dL 12.4   Hematocrit 37.0 - 48.5 % 38.7   MCV 82 - 98 fL 84   MCH 27.0 - 31.0 pg 27.0   MCHC 32.0 - 36.0 g/dL 32.0   RDW 11.5 - 14.5 % 13.7   Platelet Count 150 - 450 K/uL 288      Latest Reference Range & Units 04/21/25 12:19   Sodium 136 - 145 mmol/L 140   Potassium 3.5 - 5.1 mmol/L 3.8   Chloride 95 - 110 mmol/L 105   CO2 23 - 29 mmol/L 26   Anion Gap 8 - 16 mmol/L 9   BUN 6 - 20 mg/dL 8   Creatinine 0.5 - 1.4 mg/dL 0.6   eGFR >60 mL/min/1.73/m2 >60     EKG (4/10/24):  NSR    24 Hour Vitals:  BP: ()/()   Arterial Line BP: ()/()    See Nursing Charting For Additional Vitals      Pre-op Assessment    I have reviewed the Patient Summary Reports.     I have reviewed the Nursing Notes.       Review of Systems  Social:  No Alcohol Use, Non-Smoker       Cardiovascular:  Cardiovascular Normal                                              Pulmonary:  Pulmonary Normal                       Hepatic/GI:  Hepatic/GI Normal                    Neurological:  Neurology Normal                                       Endocrine:        Morbid Obesity / BMI > 40  Psych:    depression                    .           [1]   Social History  Socioeconomic History    Marital status:     Number of children: 2    Years of education: 12   Tobacco Use    Smoking status: Never     Passive exposure: Never    Smokeless tobacco: Never   Substance and Sexual Activity    Alcohol use: No    Drug use: No    Sexual activity: Yes     Partners: Male     Birth control/protection: None     Social Drivers of Health     Financial Resource Strain: Low Risk  (3/26/2025)    Overall Financial Resource Strain (CARDIA)     Difficulty of Paying Living Expenses: Not hard at all   Food Insecurity: Unknown (3/26/2025)    Hunger Vital Sign     Worried About Running Out of Food in the Last Year: Never true   Transportation Needs: No Transportation Needs (3/26/2025)    PRAPARE - Transportation     Lack of Transportation (Medical): No     Lack of Transportation (Non-Medical): No   Physical Activity: Insufficiently Active (3/26/2025)    Exercise Vital Sign     Days of Exercise per Week: 3 days     Minutes of Exercise per Session: 30 min   Stress: Stress Concern Present (3/26/2025)    [employer] - Occupational Stress Questionnaire     Feeling of Stress : To some extent   Housing Stability: Low Risk  (3/26/2025)    Housing Stability Vital Sign     Unable to Pay for Housing in the Last Year: No     Number of Times Moved in the Last Year: 0     Homeless in the Last Year: No   [2]   No current facility-administered medications on file prior to encounter.     Current Outpatient Medications on File Prior to Encounter   Medication Sig Dispense Refill    aluminum-magnesium hydroxide-simethicone (MAALOX ADVANCED) 200-200-20 mg/5 mL Susp Take 30 mLs by mouth 4 (four) times daily before meals and nightly. for 7 days 354 mL 0    ergocalciferol (ERGOCALCIFEROL) 50,000 unit Cap Take 1 capsule by mouth every 7 days.      ferrous gluconate  (FERGON) 324 MG tablet Take 1 tablet by mouth once daily.      levonorgestrel (MIRENA) 20 mcg/24 hr (5 years) IUD 1 Intra Uterine Device by Intrauterine route once. 1 each 0    metFORMIN (GLUCOPHAGE) 500 MG tablet TAKE 1 TABLET(500 MG) BY MOUTH DAILY WITH BREAKFAST 30 tablet 11    norethindrone-ethinyl estradiol (LOESTRIN 1/20, 21,) 1-20 mg-mcg per tablet Take 1 tablet by mouth once daily. 28 tablet 12    pantoprazole (PROTONIX) 40 MG tablet Take 1 tablet (40 mg total) by mouth once daily. (Patient not taking: Reported on 3/26/2025) 30 tablet 0    promethazine (PHENERGAN) 25 MG tablet Take 1 tablet (25 mg total) by mouth every 6 (six) hours as needed for Nausea. 15 tablet 0   [3]   Patient Active Problem List  Diagnosis    Morbid obesity with BMI of 40.0-44.9, adult    History of depression    Iron deficiency

## 2025-04-30 NOTE — TELEPHONE ENCOUNTER
----- Message from Natalie sent at 4/30/2025  9:49 AM CDT -----  Regarding: FW: needs prep for proc tomorrow    ----- Message -----  From: Ivon Montiel MA  Sent: 4/29/2025   2:24 PM CDT  To: [facility]  Subject: needs prep for proc tomorrow                       ----- Message -----  From: Marely Patel  Sent: 4/29/2025   2:19 PM CDT  To: [facility] Staff    Name of Who is Calling: Pt  What is the request in detail:Pt would like a call back in regards to not being able to receive solution for procedure on 4/3025. Pt has stated they have been trying to get solution and has been unsuccessful at doing so .Please advise thank you  Can the clinic reply by [facility]:no  What Number to [facility] Back [facility]:Telephone Information:Mobile          [phone]

## 2025-05-06 ENCOUNTER — INFUSION (OUTPATIENT)
Dept: INFUSION THERAPY | Facility: HOSPITAL | Age: 35
End: 2025-05-06
Attending: INTERNAL MEDICINE
Payer: MEDICAID

## 2025-05-06 VITALS
OXYGEN SATURATION: 99 % | TEMPERATURE: 98 F | RESPIRATION RATE: 18 BRPM | SYSTOLIC BLOOD PRESSURE: 136 MMHG | HEART RATE: 79 BPM | DIASTOLIC BLOOD PRESSURE: 76 MMHG

## 2025-05-06 DIAGNOSIS — E61.1 IRON DEFICIENCY: Primary | ICD-10-CM

## 2025-05-06 PROCEDURE — 63600175 PHARM REV CODE 636 W HCPCS: Performed by: INTERNAL MEDICINE

## 2025-05-06 PROCEDURE — 96374 THER/PROPH/DIAG INJ IV PUSH: CPT

## 2025-05-06 RX ORDER — EPINEPHRINE 0.3 MG/.3ML
0.3 INJECTION SUBCUTANEOUS ONCE AS NEEDED
OUTPATIENT
Start: 2025-05-13

## 2025-05-06 RX ORDER — EPINEPHRINE 0.3 MG/.3ML
0.3 INJECTION SUBCUTANEOUS ONCE AS NEEDED
Status: DISCONTINUED | OUTPATIENT
Start: 2025-05-06 | End: 2025-05-06 | Stop reason: HOSPADM

## 2025-05-06 RX ORDER — HEPARIN 100 UNIT/ML
500 SYRINGE INTRAVENOUS
OUTPATIENT
Start: 2025-05-13

## 2025-05-06 RX ORDER — SODIUM CHLORIDE 0.9 % (FLUSH) 0.9 %
10 SYRINGE (ML) INJECTION
OUTPATIENT
Start: 2025-05-13

## 2025-05-06 RX ADMIN — IRON SUCROSE 200 MG: 20 INJECTION, SOLUTION INTRAVENOUS at 12:05

## 2025-05-06 NOTE — PLAN OF CARE
Pt arrived to the Infusion unit for initial iron infusion (1/2). Pt is alert and oriented x4, ambulates independently with steady gait. Pt reports no new allergies, symptoms, or concerns at this time. Pt given information handouts of Venofer drug, educated on purpose of drug, possible side effects, and adverse reactions. Pt verbalizes understanding and consents to plan of care for today. PIV 24g placed in (L) AC. Pt administered Venofer 200mg IVP followed by 0.9%  mL flush bag. Pt monitored for 30min post treatment and tolerated medication well with no adverse reactions. Vital signs reassessed and stable. Pt given calendar with scheduled appointments and verbalizes no additional needs at this time. Pt ambulatory at discharge in no acute distress.       Problem: Fatigue  Goal: Improved Activity Tolerance  Outcome: Progressing

## 2025-05-13 ENCOUNTER — INFUSION (OUTPATIENT)
Dept: INFUSION THERAPY | Facility: HOSPITAL | Age: 35
End: 2025-05-13
Attending: INTERNAL MEDICINE
Payer: MEDICAID

## 2025-05-13 VITALS
SYSTOLIC BLOOD PRESSURE: 131 MMHG | OXYGEN SATURATION: 97 % | RESPIRATION RATE: 18 BRPM | HEART RATE: 105 BPM | DIASTOLIC BLOOD PRESSURE: 87 MMHG | TEMPERATURE: 99 F

## 2025-05-13 DIAGNOSIS — E61.1 IRON DEFICIENCY: Primary | ICD-10-CM

## 2025-05-13 PROCEDURE — 96374 THER/PROPH/DIAG INJ IV PUSH: CPT

## 2025-05-13 PROCEDURE — 63600175 PHARM REV CODE 636 W HCPCS: Performed by: INTERNAL MEDICINE

## 2025-05-13 RX ORDER — SODIUM CHLORIDE 0.9 % (FLUSH) 0.9 %
10 SYRINGE (ML) INJECTION
Status: CANCELLED | OUTPATIENT
Start: 2025-05-13

## 2025-05-13 RX ORDER — EPINEPHRINE 0.3 MG/.3ML
0.3 INJECTION SUBCUTANEOUS ONCE AS NEEDED
Status: CANCELLED | OUTPATIENT
Start: 2025-05-13

## 2025-05-13 RX ORDER — HEPARIN 100 UNIT/ML
500 SYRINGE INTRAVENOUS
Status: CANCELLED | OUTPATIENT
Start: 2025-05-13

## 2025-05-13 RX ADMIN — IRON SUCROSE 200 MG: 20 INJECTION, SOLUTION INTRAVENOUS at 12:05

## 2025-05-13 NOTE — PLAN OF CARE
Pt tolerated Venofer IVP with no complaints or S&S of reaction and discharged home upon completion in [facility].

## 2025-05-18 RX ORDER — SODIUM CHLORIDE 0.9 % (FLUSH) 0.9 %
10 SYRINGE (ML) INJECTION
OUTPATIENT
Start: 2025-05-21

## 2025-05-18 RX ORDER — HEPARIN 100 UNIT/ML
500 SYRINGE INTRAVENOUS
OUTPATIENT
Start: 2025-05-21

## 2025-05-18 RX ORDER — EPINEPHRINE 0.3 MG/.3ML
0.3 INJECTION SUBCUTANEOUS ONCE AS NEEDED
OUTPATIENT
Start: 2025-05-21

## 2025-06-04 ENCOUNTER — LAB VISIT (OUTPATIENT)
Dept: LAB | Facility: HOSPITAL | Age: 35
End: 2025-06-04
Attending: INTERNAL MEDICINE
Payer: MEDICAID

## 2025-06-04 DIAGNOSIS — E61.1 IRON DEFICIENCY: ICD-10-CM

## 2025-06-04 LAB
ABSOLUTE EOSINOPHIL (OHS): 0.12 K/UL
ABSOLUTE MONOCYTE (OHS): 0.47 K/UL (ref 0.3–1)
ABSOLUTE NEUTROPHIL COUNT (OHS): 5.01 K/UL (ref 1.8–7.7)
BASOPHILS # BLD AUTO: 0.03 K/UL
BASOPHILS NFR BLD AUTO: 0.4 %
ERYTHROCYTE [DISTWIDTH] IN BLOOD BY AUTOMATED COUNT: 14.4 % (ref 11.5–14.5)
FERRITIN SERPL-MCNC: 99 NG/ML (ref 20–300)
HCT VFR BLD AUTO: 38 % (ref 37–48.5)
HGB BLD-MCNC: 12.4 GM/DL (ref 12–16)
IMM GRANULOCYTES # BLD AUTO: 0.05 K/UL (ref 0–0.04)
IMM GRANULOCYTES NFR BLD AUTO: 0.6 % (ref 0–0.5)
IRON SATN MFR SERPL: 27 % (ref 20–50)
IRON SERPL-MCNC: 85 UG/DL (ref 30–160)
LYMPHOCYTES # BLD AUTO: 2.62 K/UL (ref 1–4.8)
MCH RBC QN AUTO: 28.1 PG (ref 27–31)
MCHC RBC AUTO-ENTMCNC: 32.6 G/DL (ref 32–36)
MCV RBC AUTO: 86 FL (ref 82–98)
NUCLEATED RBC (/100WBC) (OHS): 0 /100 WBC
PLATELET # BLD AUTO: 260 K/UL (ref 150–450)
PMV BLD AUTO: 9.6 FL (ref 9.2–12.9)
RBC # BLD AUTO: 4.42 M/UL (ref 4–5.4)
RELATIVE EOSINOPHIL (OHS): 1.4 %
RELATIVE LYMPHOCYTE (OHS): 31.6 % (ref 18–48)
RELATIVE MONOCYTE (OHS): 5.7 % (ref 4–15)
RELATIVE NEUTROPHIL (OHS): 60.3 % (ref 38–73)
TIBC SERPL-MCNC: 317 UG/DL (ref 250–450)
TRANSFERRIN SERPL-MCNC: 214 MG/DL (ref 200–375)
WBC # BLD AUTO: 8.3 K/UL (ref 3.9–12.7)

## 2025-06-04 PROCEDURE — 82728 ASSAY OF FERRITIN: CPT

## 2025-06-04 PROCEDURE — 85025 COMPLETE CBC W/AUTO DIFF WBC: CPT

## 2025-06-04 PROCEDURE — 36415 COLL VENOUS BLD VENIPUNCTURE: CPT | Mod: PO

## 2025-06-04 PROCEDURE — 84466 ASSAY OF TRANSFERRIN: CPT

## 2025-06-05 ENCOUNTER — INFUSION (OUTPATIENT)
Dept: INFUSION THERAPY | Facility: HOSPITAL | Age: 35
End: 2025-06-05
Payer: MEDICAID

## 2025-06-05 ENCOUNTER — TELEPHONE (OUTPATIENT)
Dept: ENDOSCOPY | Facility: HOSPITAL | Age: 35
End: 2025-06-05
Payer: MEDICAID

## 2025-06-05 DIAGNOSIS — E61.1 IRON DEFICIENCY: Primary | ICD-10-CM

## 2025-06-05 PROCEDURE — 96374 THER/PROPH/DIAG INJ IV PUSH: CPT

## 2025-06-05 PROCEDURE — 25000003 PHARM REV CODE 250: Performed by: INTERNAL MEDICINE

## 2025-06-05 PROCEDURE — 63600175 PHARM REV CODE 636 W HCPCS: Performed by: INTERNAL MEDICINE

## 2025-06-05 RX ORDER — HEPARIN 100 UNIT/ML
500 SYRINGE INTRAVENOUS
Status: DISCONTINUED | OUTPATIENT
Start: 2025-06-05 | End: 2025-06-05 | Stop reason: HOSPADM

## 2025-06-05 RX ORDER — EPINEPHRINE 0.3 MG/.3ML
0.3 INJECTION SUBCUTANEOUS ONCE AS NEEDED
OUTPATIENT
Start: 2025-06-12

## 2025-06-05 RX ORDER — SODIUM CHLORIDE 0.9 % (FLUSH) 0.9 %
10 SYRINGE (ML) INJECTION
Status: DISCONTINUED | OUTPATIENT
Start: 2025-06-05 | End: 2025-06-05 | Stop reason: HOSPADM

## 2025-06-05 RX ORDER — HEPARIN 100 UNIT/ML
500 SYRINGE INTRAVENOUS
OUTPATIENT
Start: 2025-06-12

## 2025-06-05 RX ORDER — SODIUM CHLORIDE 0.9 % (FLUSH) 0.9 %
10 SYRINGE (ML) INJECTION
OUTPATIENT
Start: 2025-06-12

## 2025-06-05 RX ADMIN — SODIUM CHLORIDE: 9 INJECTION, SOLUTION INTRAVENOUS at 09:06

## 2025-06-05 RX ADMIN — IRON SUCROSE 200 MG: 20 INJECTION, SOLUTION INTRAVENOUS at 09:06

## 2025-06-09 ENCOUNTER — OFFICE VISIT (OUTPATIENT)
Dept: HEMATOLOGY/ONCOLOGY | Facility: CLINIC | Age: 35
End: 2025-06-09
Payer: MEDICAID

## 2025-06-09 VITALS
HEART RATE: 79 BPM | HEIGHT: 65 IN | TEMPERATURE: 98 F | SYSTOLIC BLOOD PRESSURE: 122 MMHG | OXYGEN SATURATION: 98 % | BODY MASS INDEX: 48.34 KG/M2 | DIASTOLIC BLOOD PRESSURE: 81 MMHG | WEIGHT: 290.13 LBS

## 2025-06-09 DIAGNOSIS — Z80.0 FAMILY HISTORY OF COLON CANCER: ICD-10-CM

## 2025-06-09 DIAGNOSIS — E61.1 IRON DEFICIENCY: Primary | ICD-10-CM

## 2025-06-09 DIAGNOSIS — N92.4 EXCESSIVE BLEEDING IN PREMENOPAUSAL PERIOD: ICD-10-CM

## 2025-06-09 PROCEDURE — 3079F DIAST BP 80-89 MM HG: CPT | Mod: CPTII,,, | Performed by: INTERNAL MEDICINE

## 2025-06-09 PROCEDURE — 3008F BODY MASS INDEX DOCD: CPT | Mod: CPTII,,, | Performed by: INTERNAL MEDICINE

## 2025-06-09 PROCEDURE — 3074F SYST BP LT 130 MM HG: CPT | Mod: CPTII,,, | Performed by: INTERNAL MEDICINE

## 2025-06-09 PROCEDURE — 99213 OFFICE O/P EST LOW 20 MIN: CPT | Mod: S$PBB,,, | Performed by: INTERNAL MEDICINE

## 2025-06-09 PROCEDURE — 99213 OFFICE O/P EST LOW 20 MIN: CPT | Mod: PBBFAC | Performed by: INTERNAL MEDICINE

## 2025-06-09 PROCEDURE — 99999 PR PBB SHADOW E&M-EST. PATIENT-LVL III: CPT | Mod: PBBFAC,,, | Performed by: INTERNAL MEDICINE

## 2025-06-09 NOTE — PROGRESS NOTES
PATIENT: Martin Huddleston  MRN: [id number]  DATE: 6/21/2025      Diagnosis:   1. Iron deficiency    2. Excessive bleeding in premenopausal period    3. Family history of colon cancer          Chief Complaint: Follow-up (Iron)            Subjective:    Initial History: Mrs. Huddleston is a 34 y.o. female with Iron Deficiency     History of Present Illness    CHIEF COMPLAINT:  Patient was referred by gastroenterologist presents today for evaluation of anemia.    REFERRING PROVIDER:  Referred by gastroenterologist    HPI:  34-year-old medical diagnoses as listed seen today in consultation for iron deficiency.  Patient presented to the ED on March 26, 2025 for right upper quadrant pain with dyspnea.Pain has mostly subsided, with only occasional mild discomfort. She has a long history of constipation that worsens with iron supplementation, leading to the discontinuation of iron supplements on 3/26/25 Previously evaluated at [facility] in 2016 for constipation, she was recommended Miralax and Colace and is currently using Lomarset detox tea for management. She reports following a no-carbohydrate, no-sugar diet prior to the acute pain episode. Family history is significant for paternal colon cancer .She reports menorrhagia lasting 6 days, with heaviest flow on days 2-3, and her last menstrual period was approximately 3/30/25. She has a history of IUD, which is now removed, and denies any history of blood transfusions. Recent labs show no current anemia, though iron saturation is slightly low. She reports chronic fatigue, weakness, and somnolence, along with a history of sinus problems affecting taste perception.      Interval Hx: completed IV Fe 5/13/25   Continues with heavy cycles  No SOB          ROS:  General: -fever, -chills, +fatigue, -weight gain, -weight loss  Eyes: -vision changes, -redness, -discharge  ENT: -ear pain, -nasal congestion, -sore throat  Cardiovascular: -chest pain, -palpitations, -lower extremity edema  Respiratory:  "-cough, -shortness of breath, -difficulty breathing  Gastrointestinal: -abdominal pain, -nausea, -vomiting, -diarrhea, +constipation, -blood in stool  Genitourinary: -dysuria, -hematuria, -frequency  Musculoskeletal: -joint pain, -muscle pain  Skin: -rash, -lesion  Neurological: -headache, -dizziness, -numbness, -tingling  Psychiatric: -anxiety, -depression, -sleep difficulty  Female Genitourinary: +excessive vaginal bleeding          Past Medical History:   Past Medical History:   Diagnosis Date    Constipation     PCOS (polycystic ovarian syndrome)        Past Surgical HIstory:   Past Surgical History:   Procedure Laterality Date    intestinal surg       as a child    SINUS SURGERY  02/02/2017    VAGINAL DELIVERY         Family History:   Family History   Problem Relation Name Age of Onset    Colon cancer Father      Diabetes Mother      Hypertension Mother         Social History:  reports that she has never smoked. She has never been exposed to tobacco smoke. She has never used smokeless tobacco. She reports that she does not drink alcohol and does not use drugs.    Allergies:  Review of patient's allergies indicates:  No Known Allergies        Objective:      Vitals:   Vitals:    06/09/25 1457   BP: 122/81   Pulse: 79   Temp: 98.2 °F (36.8 °C)   SpO2: 98%   Weight: 131.6 kg (290 lb 2 oz)   Height: 5' 5" (1.651 m)           Physical Exam    General: No acute distress. Well-developed. Well-nourished.  Eyes: EOMI. Sclerae anicteric.  HENT: Normocephalic. Atraumatic. Nares patent. Moist oral mucosa.  Cardiovascular: Regular rate. Regular rhythm. No murmurs. No rubs.  Normal S1, S2.  Respiratory: Normal respiratory effort. Clear to auscultation bilaterally. No rales. No rhonchi. No wheezing.  Abdomen: Soft. Non-tender. Non-distended. Normoactive bowel sounds.  Musculoskeletal: No  obvious deformity.  Extremities: No lower extremity edema.  Neurological: Alert & oriented x3. No slurred speech. Normal " gait.  Psychiatric: Normal mood. Normal affect. Good insight. Good judgment.  Skin: Warm. Dry. No rash.              Laboratory Data:  Lab Visit on 06/04/2025   Component Date Value Ref Range Status    Ferritin 06/04/2025 99.0  20.0 - 300.0 ng/mL Final    Iron Level 06/04/2025 85  30 - 160 ug/dL Final    Transferrin 06/04/2025 214  200 - 375 mg/dL Final    Iron Binding Capacity Total 06/04/2025 317  250 - 450 ug/dL Final    Iron Saturation 06/04/2025 27  20 - 50 % Final    WBC 06/04/2025 8.30  3.90 - 12.70 K/uL Final    RBC 06/04/2025 4.42  4.00 - 5.40 M/uL Final    HGB 06/04/2025 12.4  12.0 - 16.0 gm/dL Final    HCT 06/04/2025 38.0  37.0 - 48.5 % Final    MCV 06/04/2025 86  82 - 98 fL Final    MCH 06/04/2025 28.1  27.0 - 31.0 pg Final    MCHC 06/04/2025 32.6  32.0 - 36.0 g/dL Final    RDW 06/04/2025 14.4  11.5 - 14.5 % Final    Platelet Count 06/04/2025 260  150 - 450 K/uL Final    MPV 06/04/2025 9.6  9.2 - 12.9 fL Final    Nucleated RBC 06/04/2025 0  <=0 /100 WBC Final    Neut % 06/04/2025 60.3  38 - 73 % Final    Lymph % 06/04/2025 31.6  18 - 48 % Final    Mono % 06/04/2025 5.7  4 - 15 % Final    Eos % 06/04/2025 1.4  <=8 % Final    Basophil % 06/04/2025 0.4  <=1.9 % Final    Imm Grans % 06/04/2025 0.6 (H)  0.0 - 0.5 % Final    Neut # 06/04/2025 5.01  1.8 - 7.7 K/uL Final    Lymph # 06/04/2025 2.62  1 - 4.8 K/uL Final    Mono # 06/04/2025 0.47  0.3 - 1 K/uL Final    Eos # 06/04/2025 0.12  <=0.5 K/uL Final    Baso # 06/04/2025 0.03  <=0.2 K/uL Final    Imm Grans # 06/04/2025 0.05 (H)  0.00 - 0.04 K/uL Final    Mild elevation in immature granulocytes is non specific and can be seen in a variety of conditions including stress response, acute inflammation, trauma and pregnancy. Correlation with other laboratory and clinical findings is essential.         Imaging:     US Abdomen Limited  Narrative: EXAMINATION:  US ABDOMEN LIMITED    CLINICAL HISTORY:  abdominal pain;    TECHNIQUE:  Limited ultrasound of the right  upper quadrant of the abdomen  was performed.    COMPARISON:  None.    FINDINGS:  Liver: Enlarged, measuring 21.7 cm in craniocaudal extent.  Mild diffuse increased echogenicity .   focal hepatic lesions.    Gallbladder: No calculi, wall thickening, or pericholecystic fluid.  No sonographic Alvarenga's sign.    Biliary system: The common duct is not dilated, measuring 5 mm.  No intrahepatic ductal dilatation.    Pancreas: Obscured by overlying bowel gas.  The visualized portions are unremarkable.    Spleen: Unremarkable, measuring 10.9 cm.    Miscellaneous: No upper abdominal ascites.  Impression: No sonographic evidence of cholelithiasis or biliary obstruction.    Mild hepatomegaly with subtle diffuse increased hepatic parenchymal echogenicity suggesting the possibility of steatosis.    Electronically signed by: Duarte Velarde MD  Date:    03/08/2025  Time:    07:27       Assessment:       1. Iron deficiency  CBC Auto Differential    Ferritin    Iron and TIBC      2. Excessive bleeding in premenopausal period        3. Family history of colon cancer               Assessment & Plan    IMPRESSION:      IRON DEFICIENCY:  Likely due to menorrhagia.  Labs reviewed.   Hb wnl   Iron studies patrick    completed IV Fe 5/13/25    Discontinued iron supplements on March 26th 2025  Intolerant of Fe supp-she stopped taking Fe   GI eval planned including EGD and colonoscopy recommended by gastroenterologist. Procedure scheduled for April 30th, 2025.  - Mistakenly prepared for colonoscopy wrong day, will need to repeat preparation.  Monitor   Follow up 4 mos with labs       Menorrhagia  Contributing to iron deficiency  Follow up with Gyn     FAMILY HISTORY OF COLON CANCER  -.Family history of colon cancer in her father, diagnosed at age 42.   GI eval planned         Cbc, FE studies prior to f/u  4 mos         Visit today included increased complexity associated with the care of the episodic problem constipation addressed and managing  the longitudinal care of the patient due to the serious and/or complex managed problem(s) Iron deficiency    Jaimee Cheng MD  [facility]-  Hematology and Oncology  [address] , [facility], [place] [address]  [place] [id number]  O: [phone] F: [phone]    This note was generated with the assistance of ambient listening technology. Verbal consent was obtained by the patient and accompanying visitor(s) for the recording of patient appointment to facilitate this note. I attest to having reviewed and edited the generated note for accuracy, though some syntax or spelling errors may persist. Please contact the author of this note for any clarification.

## 2025-06-10 DIAGNOSIS — E61.1 IRON DEFICIENCY: Primary | ICD-10-CM

## 2025-06-12 ENCOUNTER — INFUSION (OUTPATIENT)
Dept: INFUSION THERAPY | Facility: HOSPITAL | Age: 35
End: 2025-06-12
Attending: INTERNAL MEDICINE
Payer: MEDICAID

## 2025-06-12 VITALS
OXYGEN SATURATION: 97 % | HEART RATE: 89 BPM | TEMPERATURE: 99 F | SYSTOLIC BLOOD PRESSURE: 130 MMHG | DIASTOLIC BLOOD PRESSURE: 84 MMHG | RESPIRATION RATE: 17 BRPM

## 2025-06-12 DIAGNOSIS — E61.1 IRON DEFICIENCY: Primary | ICD-10-CM

## 2025-06-12 PROCEDURE — 96374 THER/PROPH/DIAG INJ IV PUSH: CPT

## 2025-06-12 PROCEDURE — 63600175 PHARM REV CODE 636 W HCPCS: Performed by: INTERNAL MEDICINE

## 2025-06-12 RX ORDER — EPINEPHRINE 0.3 MG/.3ML
0.3 INJECTION SUBCUTANEOUS ONCE AS NEEDED
Status: DISCONTINUED | OUTPATIENT
Start: 2025-06-12 | End: 2025-06-12 | Stop reason: HOSPADM

## 2025-06-12 RX ORDER — SODIUM CHLORIDE 0.9 % (FLUSH) 0.9 %
10 SYRINGE (ML) INJECTION
OUTPATIENT
Start: 2025-06-12

## 2025-06-12 RX ORDER — HEPARIN 100 UNIT/ML
500 SYRINGE INTRAVENOUS
OUTPATIENT
Start: 2025-06-12

## 2025-06-12 RX ORDER — EPINEPHRINE 0.3 MG/.3ML
0.3 INJECTION SUBCUTANEOUS ONCE AS NEEDED
OUTPATIENT
Start: 2025-06-12

## 2025-06-12 RX ADMIN — IRON SUCROSE 200 MG: 20 INJECTION, SOLUTION INTRAVENOUS at 09:06

## 2025-06-12 NOTE — PLAN OF CARE
"Pt arrived to the Infusion unit for maintenance iron infusion (2/2). Pt is awake, alert, and oriented x4. Ambulates independently with steady gait. Pt reports mild sinus headache this morning, pain 6/10. Otherwise, no other symptoms or complaints reported. Refuses medicine, will take Motrin. Pt consents to plan of care for today. PIV 24g placed in (L) AC. Pt administered Venofer 200mg IVP followed by 0.9%  mL flush bag. Pt tolerated medication well with no adverse reactions. Pt reports "I'm feeling better" after fluid administration, headache now 2/10. Pt verbalizes no additional needs at this time. Pt ambulatory at discharge in no acute distress.      Problem: Anemia  Goal: Anemia Symptom Improvement  Outcome: Progressing     Problem: Fatigue  Goal: Improved Activity Tolerance  Outcome: Met     "